# Patient Record
Sex: MALE | Race: WHITE | NOT HISPANIC OR LATINO | URBAN - METROPOLITAN AREA
[De-identification: names, ages, dates, MRNs, and addresses within clinical notes are randomized per-mention and may not be internally consistent; named-entity substitution may affect disease eponyms.]

---

## 2017-10-05 ENCOUNTER — OUTPATIENT (OUTPATIENT)
Dept: OUTPATIENT SERVICES | Facility: HOSPITAL | Age: 77
LOS: 1 days | End: 2017-10-05
Payer: MEDICARE

## 2017-10-05 ENCOUNTER — APPOINTMENT (OUTPATIENT)
Dept: ULTRASOUND IMAGING | Facility: CLINIC | Age: 77
End: 2017-10-05
Payer: MEDICARE

## 2017-10-05 DIAGNOSIS — Z00.8 ENCOUNTER FOR OTHER GENERAL EXAMINATION: ICD-10-CM

## 2017-10-05 PROCEDURE — 93975 VASCULAR STUDY: CPT

## 2017-10-05 PROCEDURE — 93975 VASCULAR STUDY: CPT | Mod: 26

## 2017-10-30 ENCOUNTER — INPATIENT (INPATIENT)
Facility: HOSPITAL | Age: 77
LOS: 5 days | Discharge: HOME HEALTH | DRG: 280 | End: 2017-11-05
Attending: INTERNAL MEDICINE | Admitting: HOSPITALIST
Payer: MEDICARE

## 2017-10-30 VITALS
RESPIRATION RATE: 20 BRPM | DIASTOLIC BLOOD PRESSURE: 70 MMHG | TEMPERATURE: 98 F | WEIGHT: 184.97 LBS | HEART RATE: 60 BPM | HEIGHT: 70 IN | OXYGEN SATURATION: 98 % | SYSTOLIC BLOOD PRESSURE: 132 MMHG

## 2017-10-30 DIAGNOSIS — Z29.9 ENCOUNTER FOR PROPHYLACTIC MEASURES, UNSPECIFIED: ICD-10-CM

## 2017-10-30 DIAGNOSIS — Z95.0 PRESENCE OF CARDIAC PACEMAKER: Chronic | ICD-10-CM

## 2017-10-30 DIAGNOSIS — Z95.5 PRESENCE OF CORONARY ANGIOPLASTY IMPLANT AND GRAFT: Chronic | ICD-10-CM

## 2017-10-30 DIAGNOSIS — N18.3 CHRONIC KIDNEY DISEASE, STAGE 3 (MODERATE): ICD-10-CM

## 2017-10-30 DIAGNOSIS — Z92.89 PERSONAL HISTORY OF OTHER MEDICAL TREATMENT: Chronic | ICD-10-CM

## 2017-10-30 DIAGNOSIS — I20.0 UNSTABLE ANGINA: ICD-10-CM

## 2017-10-30 DIAGNOSIS — K22.70 BARRETT'S ESOPHAGUS WITHOUT DYSPLASIA: ICD-10-CM

## 2017-10-30 DIAGNOSIS — Z98.890 OTHER SPECIFIED POSTPROCEDURAL STATES: Chronic | ICD-10-CM

## 2017-10-30 DIAGNOSIS — I10 ESSENTIAL (PRIMARY) HYPERTENSION: ICD-10-CM

## 2017-10-30 DIAGNOSIS — E78.00 PURE HYPERCHOLESTEROLEMIA, UNSPECIFIED: ICD-10-CM

## 2017-10-30 DIAGNOSIS — Z90.49 ACQUIRED ABSENCE OF OTHER SPECIFIED PARTS OF DIGESTIVE TRACT: Chronic | ICD-10-CM

## 2017-10-30 LAB
ALBUMIN SERPL ELPH-MCNC: 4 G/DL — SIGNIFICANT CHANGE UP (ref 3.3–5.2)
ALP SERPL-CCNC: 63 U/L — SIGNIFICANT CHANGE UP (ref 40–120)
ALT FLD-CCNC: 19 U/L — SIGNIFICANT CHANGE UP
ANION GAP SERPL CALC-SCNC: 18 MMOL/L — HIGH (ref 5–17)
APTT BLD: 24.1 SEC — LOW (ref 27.5–37.4)
AST SERPL-CCNC: 16 U/L — SIGNIFICANT CHANGE UP
BASOPHILS # BLD AUTO: 0 K/UL — SIGNIFICANT CHANGE UP (ref 0–0.2)
BASOPHILS NFR BLD AUTO: 0 % — SIGNIFICANT CHANGE UP (ref 0–2)
BILIRUB SERPL-MCNC: 1 MG/DL — SIGNIFICANT CHANGE UP (ref 0.4–2)
BUN SERPL-MCNC: 26 MG/DL — HIGH (ref 8–20)
CALCIUM SERPL-MCNC: 8.9 MG/DL — SIGNIFICANT CHANGE UP (ref 8.6–10.2)
CHLORIDE SERPL-SCNC: 105 MMOL/L — SIGNIFICANT CHANGE UP (ref 98–107)
CK MB CFR SERPL CALC: 63.4 NG/ML — HIGH (ref 0–6.7)
CK SERPL-CCNC: 58 U/L — SIGNIFICANT CHANGE UP (ref 30–200)
CK SERPL-CCNC: 616 U/L — HIGH (ref 30–200)
CO2 SERPL-SCNC: 17 MMOL/L — LOW (ref 22–29)
CREAT SERPL-MCNC: 1.39 MG/DL — HIGH (ref 0.5–1.3)
EOSINOPHIL # BLD AUTO: 0.2 K/UL — SIGNIFICANT CHANGE UP (ref 0–0.5)
EOSINOPHIL NFR BLD AUTO: 4 % — SIGNIFICANT CHANGE UP (ref 0–6)
GLUCOSE SERPL-MCNC: 122 MG/DL — HIGH (ref 70–115)
HCT VFR BLD CALC: 43.6 % — SIGNIFICANT CHANGE UP (ref 42–52)
HGB BLD-MCNC: 15.4 G/DL — SIGNIFICANT CHANGE UP (ref 14–18)
INR BLD: 1.05 RATIO — SIGNIFICANT CHANGE UP (ref 0.88–1.16)
LYMPHOCYTES # BLD AUTO: 1.6 K/UL — SIGNIFICANT CHANGE UP (ref 1–4.8)
LYMPHOCYTES # BLD AUTO: 8 % — LOW (ref 20–55)
MCHC RBC-ENTMCNC: 34 PG — HIGH (ref 27–31)
MCHC RBC-ENTMCNC: 35.3 G/DL — SIGNIFICANT CHANGE UP (ref 32–36)
MCV RBC AUTO: 96.2 FL — HIGH (ref 80–94)
MONOCYTES # BLD AUTO: 1.8 K/UL — HIGH (ref 0–0.8)
MONOCYTES NFR BLD AUTO: 10 % — SIGNIFICANT CHANGE UP (ref 3–10)
NEUTROPHILS # BLD AUTO: 10 K/UL — HIGH (ref 1.8–8)
NEUTROPHILS NFR BLD AUTO: 72 % — SIGNIFICANT CHANGE UP (ref 37–73)
PLAT MORPH BLD: NORMAL — SIGNIFICANT CHANGE UP
PLATELET # BLD AUTO: 137 K/UL — LOW (ref 150–400)
POTASSIUM SERPL-MCNC: 4 MMOL/L — SIGNIFICANT CHANGE UP (ref 3.5–5.3)
POTASSIUM SERPL-SCNC: 4 MMOL/L — SIGNIFICANT CHANGE UP (ref 3.5–5.3)
PROT SERPL-MCNC: 6.6 G/DL — SIGNIFICANT CHANGE UP (ref 6.6–8.7)
PROTHROM AB SERPL-ACNC: 11.6 SEC — SIGNIFICANT CHANGE UP (ref 9.8–12.7)
RBC # BLD: 4.53 M/UL — LOW (ref 4.6–6.2)
RBC # FLD: 12.9 % — SIGNIFICANT CHANGE UP (ref 11–15.6)
RBC BLD AUTO: SIGNIFICANT CHANGE UP
SODIUM SERPL-SCNC: 140 MMOL/L — SIGNIFICANT CHANGE UP (ref 135–145)
TROPONIN T SERPL-MCNC: 0.47 NG/ML — HIGH (ref 0–0.06)
TROPONIN T SERPL-MCNC: <0.01 NG/ML — SIGNIFICANT CHANGE UP (ref 0–0.06)
VARIANT LYMPHS # BLD: 6 % — SIGNIFICANT CHANGE UP (ref 0–6)
WBC # BLD: 13.7 K/UL — HIGH (ref 4.8–10.8)
WBC # FLD AUTO: 13.7 K/UL — HIGH (ref 4.8–10.8)

## 2017-10-30 PROCEDURE — 71010: CPT | Mod: 26

## 2017-10-30 PROCEDURE — 99285 EMERGENCY DEPT VISIT HI MDM: CPT

## 2017-10-30 PROCEDURE — 99223 1ST HOSP IP/OBS HIGH 75: CPT

## 2017-10-30 PROCEDURE — 71275 CT ANGIOGRAPHY CHEST: CPT | Mod: 26

## 2017-10-30 RX ORDER — ENOXAPARIN SODIUM 100 MG/ML
30 INJECTION SUBCUTANEOUS EVERY 24 HOURS
Qty: 0 | Refills: 0 | Status: DISCONTINUED | OUTPATIENT
Start: 2017-10-30 | End: 2017-10-31

## 2017-10-30 RX ORDER — NITROGLYCERIN 6.5 MG
1 CAPSULE, EXTENDED RELEASE ORAL ONCE
Qty: 0 | Refills: 0 | Status: COMPLETED | OUTPATIENT
Start: 2017-10-30 | End: 2017-10-30

## 2017-10-30 RX ORDER — ASPIRIN/CALCIUM CARB/MAGNESIUM 324 MG
1 TABLET ORAL
Qty: 0 | Refills: 0 | COMMUNITY

## 2017-10-30 RX ORDER — MORPHINE SULFATE 50 MG/1
4 CAPSULE, EXTENDED RELEASE ORAL ONCE
Qty: 0 | Refills: 0 | Status: DISCONTINUED | OUTPATIENT
Start: 2017-10-30 | End: 2017-10-30

## 2017-10-30 RX ORDER — CLOPIDOGREL BISULFATE 75 MG/1
1 TABLET, FILM COATED ORAL
Qty: 0 | Refills: 0 | COMMUNITY

## 2017-10-30 RX ORDER — ENOXAPARIN SODIUM 100 MG/ML
80 INJECTION SUBCUTANEOUS ONCE
Qty: 0 | Refills: 0 | Status: COMPLETED | OUTPATIENT
Start: 2017-10-30 | End: 2017-10-30

## 2017-10-30 RX ORDER — PANTOPRAZOLE SODIUM 20 MG/1
40 TABLET, DELAYED RELEASE ORAL
Qty: 0 | Refills: 0 | Status: DISCONTINUED | OUTPATIENT
Start: 2017-10-30 | End: 2017-11-05

## 2017-10-30 RX ORDER — LOVASTATIN 20 MG
1 TABLET ORAL
Qty: 0 | Refills: 0 | COMMUNITY

## 2017-10-30 RX ORDER — IBUPROFEN 200 MG
600 TABLET ORAL ONCE
Qty: 0 | Refills: 0 | Status: COMPLETED | OUTPATIENT
Start: 2017-10-30 | End: 2017-10-30

## 2017-10-30 RX ORDER — CHOLECALCIFEROL (VITAMIN D3) 125 MCG
0 CAPSULE ORAL
Qty: 0 | Refills: 0 | COMMUNITY

## 2017-10-30 RX ORDER — PANTOPRAZOLE SODIUM 20 MG/1
0 TABLET, DELAYED RELEASE ORAL
Qty: 0 | Refills: 0 | COMMUNITY

## 2017-10-30 RX ORDER — ACETAMINOPHEN 500 MG
1000 TABLET ORAL ONCE
Qty: 0 | Refills: 0 | Status: COMPLETED | OUTPATIENT
Start: 2017-10-30 | End: 2017-10-30

## 2017-10-30 RX ORDER — ASPIRIN/CALCIUM CARB/MAGNESIUM 324 MG
81 TABLET ORAL DAILY
Qty: 0 | Refills: 0 | Status: DISCONTINUED | OUTPATIENT
Start: 2017-10-30 | End: 2017-11-05

## 2017-10-30 RX ORDER — LOSARTAN POTASSIUM 100 MG/1
50 TABLET, FILM COATED ORAL DAILY
Qty: 0 | Refills: 0 | Status: DISCONTINUED | OUTPATIENT
Start: 2017-10-30 | End: 2017-10-31

## 2017-10-30 RX ORDER — OMEGA-3 ACID ETHYL ESTERS 1 G
0 CAPSULE ORAL
Qty: 0 | Refills: 0 | COMMUNITY

## 2017-10-30 RX ORDER — ATORVASTATIN CALCIUM 80 MG/1
40 TABLET, FILM COATED ORAL AT BEDTIME
Qty: 0 | Refills: 0 | Status: DISCONTINUED | OUTPATIENT
Start: 2017-10-30 | End: 2017-11-05

## 2017-10-30 RX ORDER — MORPHINE SULFATE 50 MG/1
2 CAPSULE, EXTENDED RELEASE ORAL ONCE
Qty: 0 | Refills: 0 | Status: DISCONTINUED | OUTPATIENT
Start: 2017-10-30 | End: 2017-10-30

## 2017-10-30 RX ORDER — AMLODIPINE BESYLATE 2.5 MG/1
10 TABLET ORAL DAILY
Qty: 0 | Refills: 0 | Status: DISCONTINUED | OUTPATIENT
Start: 2017-10-30 | End: 2017-10-31

## 2017-10-30 RX ORDER — CLOPIDOGREL BISULFATE 75 MG/1
75 TABLET, FILM COATED ORAL DAILY
Qty: 0 | Refills: 0 | Status: DISCONTINUED | OUTPATIENT
Start: 2017-10-30 | End: 2017-11-05

## 2017-10-30 RX ORDER — MORPHINE SULFATE 50 MG/1
2 CAPSULE, EXTENDED RELEASE ORAL EVERY 4 HOURS
Qty: 0 | Refills: 0 | Status: DISCONTINUED | OUTPATIENT
Start: 2017-10-30 | End: 2017-10-31

## 2017-10-30 RX ADMIN — ATORVASTATIN CALCIUM 40 MILLIGRAM(S): 80 TABLET, FILM COATED ORAL at 22:50

## 2017-10-30 RX ADMIN — ENOXAPARIN SODIUM 30 MILLIGRAM(S): 100 INJECTION SUBCUTANEOUS at 22:49

## 2017-10-30 RX ADMIN — MORPHINE SULFATE 2 MILLIGRAM(S): 50 CAPSULE, EXTENDED RELEASE ORAL at 20:30

## 2017-10-30 RX ADMIN — Medication 1000 MILLIGRAM(S): at 23:20

## 2017-10-30 RX ADMIN — MORPHINE SULFATE 4 MILLIGRAM(S): 50 CAPSULE, EXTENDED RELEASE ORAL at 16:08

## 2017-10-30 RX ADMIN — MORPHINE SULFATE 2 MILLIGRAM(S): 50 CAPSULE, EXTENDED RELEASE ORAL at 15:21

## 2017-10-30 RX ADMIN — ENOXAPARIN SODIUM 80 MILLIGRAM(S): 100 INJECTION SUBCUTANEOUS at 18:32

## 2017-10-30 RX ADMIN — MORPHINE SULFATE 2 MILLIGRAM(S): 50 CAPSULE, EXTENDED RELEASE ORAL at 15:11

## 2017-10-30 RX ADMIN — Medication 1 INCH(S): at 15:11

## 2017-10-30 RX ADMIN — Medication 400 MILLIGRAM(S): at 22:50

## 2017-10-30 RX ADMIN — AMLODIPINE BESYLATE 10 MILLIGRAM(S): 2.5 TABLET ORAL at 22:50

## 2017-10-30 RX ADMIN — MORPHINE SULFATE 2 MILLIGRAM(S): 50 CAPSULE, EXTENDED RELEASE ORAL at 14:42

## 2017-10-30 RX ADMIN — MORPHINE SULFATE 2 MILLIGRAM(S): 50 CAPSULE, EXTENDED RELEASE ORAL at 21:00

## 2017-10-30 NOTE — H&P ADULT - PSH
H/O cardiac pacemaker  2016  H/O heart artery stent  x3 [id number]  H/O transfusion  x2 5/2016  History of appendectomy  1997

## 2017-10-30 NOTE — H&P ADULT - HISTORY OF PRESENT ILLNESS
76 yo M with h/o CAD, CABG, Phil's esophagus, CKD Stage 3, oral ca s/p right mandible resection, CORNELIO presents with several hours of substernal dull chest pain associated with diaphoresis, 10/10, mildly relieved by nitro and morphine, radiates to back. Denies any sick contacts or recent travles. States that he was in his usual state of healthy when he started experiencing the pain.     In the ED, son and wife at bedside. CTA chest negative for dissection.

## 2017-10-30 NOTE — H&P ADULT - ASSESSMENT
78 yo M with h/o CAD, CABG, Phil's esophagus, CKD Stage 3, oral ca s/p right mandible resection, CORNELIO here with unstable angina.

## 2017-10-30 NOTE — ED ADULT NURSE REASSESSMENT NOTE - NS ED NURSE REASSESS COMMENT FT1
Assumed patient care from KARY Chappell at 1930, charting as noted. Received patient lying in bed, a&ox3, able to make needs known. Left wrist iv site without redness or swelling, patent. Patient noted with nitrobid patch on right chest wall. Patient received sinus kale on cm, no complains of pain and discomfort at this time. Plan of care and wait time explained, patient verbalized understanding. Patient not in respiratory distress. To continue to monitor.

## 2017-10-30 NOTE — H&P ADULT - PMH
Coronary artery disease involving native heart, angina presence unspecified, unspecified vessel or lesion type    High cholesterol    Sick sinus syndrome

## 2017-10-30 NOTE — ED PROVIDER NOTE - PMH
No pertinent past medical history Coronary artery disease involving native heart, angina presence unspecified, unspecified vessel or lesion type    High cholesterol    Sick sinus syndrome

## 2017-10-30 NOTE — ED PROVIDER NOTE - OBJECTIVE STATEMENT
77 year old male with PMH CABG and 3 stents, PPM, htn, hld presents with chest pain,. Sx started 1 hour prior to arrival while at rest with a dull pain in the center of his chest, no radiation, constant, /10, no associated SOB, diaphoresis, lightheadedness, and no alleviating/exacerbating factors, not related to exertion, position, deep inspiration.

## 2017-10-30 NOTE — ED ADULT NURSE REASSESSMENT NOTE - NS ED NURSE REASSESS COMMENT FT1
[facility]ist JESSICA Keenan called back at 2045 and was made aware of patient's trop=0.47 and requests for IV tylenol despite PRN Morphine. No order made at this time.

## 2017-10-30 NOTE — ED ADULT NURSE NOTE - OBJECTIVE STATEMENT
77 year old a&ox3 male comes to ED c/ o of sudden chest pain starting an hour and a half a go. as per pt. he was not doing anything. pt. states it sometimes goes to his back . pt. denies nausea, sob. pt. c/o 8/10 pain. medications given as documented. will continue to monitor.

## 2017-10-30 NOTE — CONSULT NOTE ADULT - SUBJECTIVE AND OBJECTIVE BOX
Chief Complaint: chest pain    HPI: 78 yo male with cad and remote cabg and stents-last 2009-presents with several hours of midsternal dull chest pain-severe- wo significant radiation or diaphoresis. Pain not very similar to his prior cardiac episodes. He's been riding a bike outdoors over the last month wo sx's. Today's pain is at rest. PMH + MI/cabg and stents/hypertension/cri/Moreno's esoph/right mandible surgery for Ca/ppm for heart block/sharon-not on any cpap etc. Allergic to ceftin (cdif) and enalapril-hypotension. Non smoker x 30+ yrs no etoh. Denies edema orthopnea  pnd. Denies pulmonary/hepatic/heme/dm or thyroid hx. ROS otherwise neg. OP meds of losarten 50 qd/asa 81 and plavix/mevacor 40/protonix/. other surgery includes AP and GB.    PAST MEDICAL & SURGICAL HISTORY:  High cholesterol  H/O transfusion: x2 5/2016  H/O heart artery stent: x3 [id number]  History of appendectomy: 1997  H/O cardiac pacemaker: 2016      PREVIOUS DIAGNOSTIC TESTING:      ECHO  FINDINGS: 2015- EF 50+%/ paradoxical septum  inferobasal hypo.    STRESS  FINDINGS:    CATHETERIZATION  FINDINGS:    MEDICATIONS  (STANDING):    MEDICATIONS  (PRN):      FAMILY HISTORY:      SOCIAL HISTORY:     CIGARETTES: 0    ALCOHOL: 0    ROS: Negative other than as mentioned in HPI.    Vital Signs Last 24 Hrs  T(C): 36.7 (30 Oct 2017 15:52), Max: 36.7 (30 Oct 2017 15:52)  T(F): 98 (30 Oct 2017 15:52), Max: 98 (30 Oct 2017 15:52)  HR: 55 (30 Oct 2017 15:52) (55 - 60)  BP: 110/70 LA manually-good pulses thruout bilat.  BP(mean): --  RR: 16 flat (30 Oct 2017 15:52) (20 - 20)  SpO2: 98% (30 Oct 2017 15:52) (98% - 98%)    PHYSICAL EXAM:  General: Appears well developed, well nourished alert and cooperative.  HEENT: Head; normocephalic, atraumatic.  Eyes;   Pupils reactive, cornea wnl.  Neck; Supple, no nodes adenopathy, no NVD or carotid bruit or thyromegaly.  CARDIOVASCULAR; No murmur, rub, gallop or lift. Normal S1 and S2.  LUNGS; No rales, rhonchi or wheeze. Normal breath sounds bilaterally.  ABDOMEN ; Soft, nontender without mass or organomegaly. bowel sounds normoactive.  EXTREMITIES; No clubbing, cyanosis or edema. Distal pulses wnl. ROM normal.  SKIN; warm and dry with normal turgor.  NEURO; Alert/oriented x 3/normal motor exam. No pathologic reflexes.    PSYCH; normal affect.            INTERPRETATION OF TELEMETRY:    ECG: VVI paced    I&O's Detail      LABS:                        15.4   13.7  )-----------( 137      ( 30 Oct 2017 14:46 )             43.6     10-30    140  |  105  |  26.0<H>  ----------------------------<  122<H>  4.0   |  17.0<L>  |  1.39<H>    Ca    8.9      30 Oct 2017 14:46    TPro  6.6  /  Alb  4.0  /  TBili  1.0  /  DBili  x   /  AST  16  /  ALT  19  /  AlkPhos  63  10-30    CARDIAC MARKERS ( 30 Oct 2017 14:46 )  x     / <0.01 ng/mL / 58 U/L / x     / x          PT/INR - ( 30 Oct 2017 14:46 )   PT: 11.6 sec;   INR: 1.05 ratio         PTT - ( 30 Oct 2017 14:46 )  PTT:24.1 sec    I&O's Summary      RADIOLOGY & ADDITIONAL STUDIES: Chief Complaint: chest pain    HPI: 76 yo male with cad and remote cabg and stents-last 2009-presents with several hours of midsternal dull chest pain-severe- wo significant radiation or diaphoresis. Pain not very similar to his prior cardiac episodes. He's been riding a bike outdoors over the last month wo sx's. Today's pain is at rest. PMH + MI/cabg and stents/hypertension/cri/Moreno's esoph/right mandible surgery for Ca/ppm for heart block/sharon-not on any cpap etc. Allergic to ceftin (cdif) and enalapril-hypotension. Non smoker x 30+ yrs no etoh. Denies edema orthopnea  pnd. Denies pulmonary/hepatic/heme/dm or thyroid hx. ROS otherwise neg. OP meds of losarten 50 qd/asa 81 and plavix/mevacor 40/protonix/. other surgery includes AP and GB.    PAST MEDICAL & SURGICAL HISTORY:  High cholesterol  H/O transfusion: x2 5/2016  H/O heart artery stent: x3 [phone]  History of appendectomy: 1997  H/O cardiac pacemaker: 2016      PREVIOUS DIAGNOSTIC TESTING:      ECHO  FINDINGS: 2015- EF 50+%/ paradoxical septum  inferobasal hypo.    STRESS  FINDINGS:    CATHETERIZATION  FINDINGS:    MEDICATIONS  (STANDING):    MEDICATIONS  (PRN):      FAMILY HISTORY:      SOCIAL HISTORY:     CIGARETTES: 0    ALCOHOL: 0    ROS: Negative other than as mentioned in HPI.    Vital Signs Last 24 Hrs  T(C): 36.7 (30 Oct 2017 15:52), Max: 36.7 (30 Oct 2017 15:52)  T(F): 98 (30 Oct 2017 15:52), Max: 98 (30 Oct 2017 15:52)  HR: 55 (30 Oct 2017 15:52) (55 - 60)  BP: 110/70 LA manually-good pulses thruout bilat.  BP(mean): --  RR: 16 flat (30 Oct 2017 15:52) (20 - 20)  SpO2: 98% (30 Oct 2017 15:52) (98% - 98%)    PHYSICAL EXAM:  General: Appears well developed, well nourished alert and cooperative.  HEENT: Head; normocephalic, atraumatic.  Eyes;   Pupils reactive, cornea wnl.  Neck; Supple, no nodes adenopathy, no NVD or carotid bruit or thyromegaly.  CARDIOVASCULAR; No murmur, rub, gallop or lift. Normal S1 and S2.  LUNGS; No rales, rhonchi or wheeze. Normal breath sounds bilaterally.  ABDOMEN ; Soft, nontender without mass or organomegaly. bowel sounds normoactive.  EXTREMITIES; No clubbing, cyanosis or edema. Distal pulses wnl. ROM normal.  SKIN; warm and dry with normal turgor.  NEURO; Alert/oriented x 3/normal motor exam. No pathologic reflexes.    PSYCH; normal affect.            INTERPRETATION OF TELEMETRY:    ECG: VVI paced  cxr cardiomeg. nonspecific markings.  I&O's Detail      LABS:                        15.4   13.7  )-----------( 137      ( 30 Oct 2017 14:46 )             43.6     10-30    140  |  105  |  26.0<H>  ----------------------------<  122<H>  4.0   |  17.0<L>  |  1.39<H>    Ca    8.9      30 Oct 2017 14:46    TPro  6.6  /  Alb  4.0  /  TBili  1.0  /  DBili  x   /  AST  16  /  ALT  19  /  AlkPhos  63  10-30    CARDIAC MARKERS ( 30 Oct 2017 14:46 )  x     / <0.01 ng/mL / 58 U/L / x     / x          PT/INR - ( 30 Oct 2017 14:46 )   PT: 11.6 sec;   INR: 1.05 ratio         PTT - ( 30 Oct 2017 14:46 )  PTT:24.1 sec    I&O's Summary      RADIOLOGY & ADDITIONAL STUDIES:

## 2017-10-30 NOTE — ED ADULT NURSE REASSESSMENT NOTE - NS ED NURSE REASSESS COMMENT FT1
Received critical value from Lab, Trop=0.47. Paged [facility] hospitalist via ed pickett clerk for notification. Awaiting call back.

## 2017-10-30 NOTE — H&P ADULT - RS GEN PE MLT RESP DETAILS PC
clear to auscultation bilaterally/no chest wall tenderness/no intercostal retractions/good air movement/breath sounds equal

## 2017-10-30 NOTE — ED PROVIDER NOTE - PSH
H/O cardiac pacemaker  2016  H/O heart artery stent  x3 [phone]  H/O transfusion  x2 5/2016  History of appendectomy  1997

## 2017-10-30 NOTE — ED ADULT NURSE NOTE - PSH
H/O cardiac pacemaker  2016  H/O heart artery stent  x3 [phone]  H/O transfusion  x2 5/2016  History of appendectomy  1997 H/O cardiac pacemaker  2016  H/O heart artery stent  x3 [phone]  H/O transfusion  x2 5/2016  History of appendectomy  1997  History of open heart surgery  x2

## 2017-10-31 DIAGNOSIS — N17.9 ACUTE KIDNEY FAILURE, UNSPECIFIED: ICD-10-CM

## 2017-10-31 DIAGNOSIS — I21.4 NON-ST ELEVATION (NSTEMI) MYOCARDIAL INFARCTION: ICD-10-CM

## 2017-10-31 LAB
ALBUMIN SERPL ELPH-MCNC: 3.6 G/DL — SIGNIFICANT CHANGE UP (ref 3.3–5.2)
ALP SERPL-CCNC: 64 U/L — SIGNIFICANT CHANGE UP (ref 40–120)
ALT FLD-CCNC: 59 U/L — HIGH
ANION GAP SERPL CALC-SCNC: 15 MMOL/L — SIGNIFICANT CHANGE UP (ref 5–17)
ANION GAP SERPL CALC-SCNC: 15 MMOL/L — SIGNIFICANT CHANGE UP (ref 5–17)
ANION GAP SERPL CALC-SCNC: 16 MMOL/L — SIGNIFICANT CHANGE UP (ref 5–17)
APPEARANCE UR: CLEAR — SIGNIFICANT CHANGE UP
AST SERPL-CCNC: 287 U/L — HIGH
BILIRUB SERPL-MCNC: 1.3 MG/DL — SIGNIFICANT CHANGE UP (ref 0.4–2)
BILIRUB UR-MCNC: NEGATIVE — SIGNIFICANT CHANGE UP
BUN SERPL-MCNC: 24 MG/DL — HIGH (ref 8–20)
BUN SERPL-MCNC: 24 MG/DL — HIGH (ref 8–20)
BUN SERPL-MCNC: 31 MG/DL — HIGH (ref 8–20)
CALCIUM SERPL-MCNC: 8.2 MG/DL — LOW (ref 8.6–10.2)
CALCIUM SERPL-MCNC: 8.2 MG/DL — LOW (ref 8.6–10.2)
CALCIUM SERPL-MCNC: 8.5 MG/DL — LOW (ref 8.6–10.2)
CHLORIDE SERPL-SCNC: 102 MMOL/L — SIGNIFICANT CHANGE UP (ref 98–107)
CHLORIDE SERPL-SCNC: 106 MMOL/L — SIGNIFICANT CHANGE UP (ref 98–107)
CHLORIDE SERPL-SCNC: 106 MMOL/L — SIGNIFICANT CHANGE UP (ref 98–107)
CK MB CFR SERPL CALC: 281.7 NG/ML — HIGH (ref 0–6.7)
CK SERPL-CCNC: 3652 U/L — HIGH (ref 30–200)
CO2 SERPL-SCNC: 19 MMOL/L — LOW (ref 22–29)
COLOR SPEC: SIGNIFICANT CHANGE UP
CREAT SERPL-MCNC: 1.45 MG/DL — HIGH (ref 0.5–1.3)
CREAT SERPL-MCNC: 1.45 MG/DL — HIGH (ref 0.5–1.3)
CREAT SERPL-MCNC: 1.68 MG/DL — HIGH (ref 0.5–1.3)
DIFF PNL FLD: NEGATIVE — SIGNIFICANT CHANGE UP
EPI CELLS # UR: SIGNIFICANT CHANGE UP
GLUCOSE SERPL-MCNC: 130 MG/DL — HIGH (ref 70–115)
GLUCOSE SERPL-MCNC: 130 MG/DL — HIGH (ref 70–115)
GLUCOSE SERPL-MCNC: 149 MG/DL — HIGH (ref 70–115)
GLUCOSE UR QL: NEGATIVE MG/DL — SIGNIFICANT CHANGE UP
HCT VFR BLD CALC: 46 % — SIGNIFICANT CHANGE UP (ref 42–52)
HGB BLD-MCNC: 15.7 G/DL — SIGNIFICANT CHANGE UP (ref 14–18)
KETONES UR-MCNC: NEGATIVE — SIGNIFICANT CHANGE UP
LEUKOCYTE ESTERASE UR-ACNC: NEGATIVE — SIGNIFICANT CHANGE UP
MCHC RBC-ENTMCNC: 33.8 PG — HIGH (ref 27–31)
MCHC RBC-ENTMCNC: 34.1 G/DL — SIGNIFICANT CHANGE UP (ref 32–36)
MCV RBC AUTO: 98.9 FL — HIGH (ref 80–94)
NITRITE UR-MCNC: NEGATIVE — SIGNIFICANT CHANGE UP
OSMOLALITY UR: 323 MOSM/KG — SIGNIFICANT CHANGE UP (ref 300–1000)
PH UR: 6 — SIGNIFICANT CHANGE UP (ref 5–8)
PLATELET # BLD AUTO: 144 K/UL — LOW (ref 150–400)
POTASSIUM SERPL-MCNC: 4 MMOL/L — SIGNIFICANT CHANGE UP (ref 3.5–5.3)
POTASSIUM SERPL-MCNC: 4 MMOL/L — SIGNIFICANT CHANGE UP (ref 3.5–5.3)
POTASSIUM SERPL-MCNC: 4.6 MMOL/L — SIGNIFICANT CHANGE UP (ref 3.5–5.3)
POTASSIUM SERPL-SCNC: 4 MMOL/L — SIGNIFICANT CHANGE UP (ref 3.5–5.3)
POTASSIUM SERPL-SCNC: 4 MMOL/L — SIGNIFICANT CHANGE UP (ref 3.5–5.3)
POTASSIUM SERPL-SCNC: 4.6 MMOL/L — SIGNIFICANT CHANGE UP (ref 3.5–5.3)
PROT SERPL-MCNC: 6.2 G/DL — LOW (ref 6.6–8.7)
PROT UR-MCNC: NEGATIVE MG/DL — SIGNIFICANT CHANGE UP
RBC # BLD: 4.65 M/UL — SIGNIFICANT CHANGE UP (ref 4.6–6.2)
RBC # FLD: 13.5 % — SIGNIFICANT CHANGE UP (ref 11–15.6)
RBC CASTS # UR COMP ASSIST: SIGNIFICANT CHANGE UP /HPF (ref 0–4)
SODIUM SERPL-SCNC: 137 MMOL/L — SIGNIFICANT CHANGE UP (ref 135–145)
SODIUM SERPL-SCNC: 140 MMOL/L — SIGNIFICANT CHANGE UP (ref 135–145)
SODIUM SERPL-SCNC: 140 MMOL/L — SIGNIFICANT CHANGE UP (ref 135–145)
SODIUM UR-SCNC: 125 MMOL/L — SIGNIFICANT CHANGE UP
SP GR SPEC: 1 — LOW (ref 1.01–1.02)
TROPONIN T SERPL-MCNC: 3.37 NG/ML — HIGH (ref 0–0.06)
TROPONIN T SERPL-MCNC: 5.6 NG/ML — HIGH (ref 0–0.06)
UROBILINOGEN FLD QL: NEGATIVE MG/DL — SIGNIFICANT CHANGE UP
WBC # BLD: 17.2 K/UL — HIGH (ref 4.8–10.8)
WBC # FLD AUTO: 17.2 K/UL — HIGH (ref 4.8–10.8)
WBC UR QL: SIGNIFICANT CHANGE UP

## 2017-10-31 PROCEDURE — 71275 CT ANGIOGRAPHY CHEST: CPT | Mod: 26

## 2017-10-31 PROCEDURE — 71010: CPT | Mod: 26

## 2017-10-31 PROCEDURE — 99232 SBSQ HOSP IP/OBS MODERATE 35: CPT

## 2017-10-31 PROCEDURE — 93306 TTE W/DOPPLER COMPLETE: CPT | Mod: 26

## 2017-10-31 PROCEDURE — 99223 1ST HOSP IP/OBS HIGH 75: CPT

## 2017-10-31 PROCEDURE — 99233 SBSQ HOSP IP/OBS HIGH 50: CPT

## 2017-10-31 RX ORDER — AMLODIPINE BESYLATE 2.5 MG/1
10 TABLET ORAL DAILY
Qty: 0 | Refills: 0 | Status: DISCONTINUED | OUTPATIENT
Start: 2017-10-31 | End: 2017-10-31

## 2017-10-31 RX ORDER — HEPARIN SODIUM 5000 [USP'U]/ML
INJECTION INTRAVENOUS; SUBCUTANEOUS
Qty: 25000 | Refills: 0 | Status: DISCONTINUED | OUTPATIENT
Start: 2017-10-31 | End: 2017-10-31

## 2017-10-31 RX ORDER — HEPARIN SODIUM 5000 [USP'U]/ML
5000 INJECTION INTRAVENOUS; SUBCUTANEOUS ONCE
Qty: 0 | Refills: 0 | Status: DISCONTINUED | OUTPATIENT
Start: 2017-10-31 | End: 2017-10-31

## 2017-10-31 RX ORDER — METOPROLOL TARTRATE 50 MG
25 TABLET ORAL
Qty: 0 | Refills: 0 | Status: DISCONTINUED | OUTPATIENT
Start: 2017-10-31 | End: 2017-11-01

## 2017-10-31 RX ORDER — FUROSEMIDE 40 MG
80 TABLET ORAL ONCE
Qty: 0 | Refills: 0 | Status: COMPLETED | OUTPATIENT
Start: 2017-10-31 | End: 2017-10-31

## 2017-10-31 RX ORDER — HEPARIN SODIUM 5000 [USP'U]/ML
5000 INJECTION INTRAVENOUS; SUBCUTANEOUS EVERY 6 HOURS
Qty: 0 | Refills: 0 | Status: DISCONTINUED | OUTPATIENT
Start: 2017-10-31 | End: 2017-10-31

## 2017-10-31 RX ORDER — SODIUM CHLORIDE 9 MG/ML
1000 INJECTION INTRAMUSCULAR; INTRAVENOUS; SUBCUTANEOUS ONCE
Qty: 0 | Refills: 0 | Status: COMPLETED | OUTPATIENT
Start: 2017-10-31 | End: 2017-10-31

## 2017-10-31 RX ORDER — MORPHINE SULFATE 50 MG/1
2.5 CAPSULE, EXTENDED RELEASE ORAL EVERY 4 HOURS
Qty: 0 | Refills: 0 | Status: DISCONTINUED | OUTPATIENT
Start: 2017-10-31 | End: 2017-11-05

## 2017-10-31 RX ORDER — SODIUM CHLORIDE 9 MG/ML
1000 INJECTION INTRAMUSCULAR; INTRAVENOUS; SUBCUTANEOUS
Qty: 0 | Refills: 0 | Status: DISCONTINUED | OUTPATIENT
Start: 2017-10-31 | End: 2017-10-31

## 2017-10-31 RX ORDER — ACETYLCYSTEINE 200 MG/ML
1200 VIAL (ML) MISCELLANEOUS EVERY 12 HOURS
Qty: 0 | Refills: 0 | Status: COMPLETED | OUTPATIENT
Start: 2017-10-31 | End: 2017-11-01

## 2017-10-31 RX ORDER — LOSARTAN POTASSIUM 100 MG/1
50 TABLET, FILM COATED ORAL DAILY
Qty: 0 | Refills: 0 | Status: DISCONTINUED | OUTPATIENT
Start: 2017-10-31 | End: 2017-10-31

## 2017-10-31 RX ORDER — MORPHINE SULFATE 50 MG/1
2 CAPSULE, EXTENDED RELEASE ORAL ONCE
Qty: 0 | Refills: 0 | Status: DISCONTINUED | OUTPATIENT
Start: 2017-10-31 | End: 2017-10-31

## 2017-10-31 RX ADMIN — Medication 81 MILLIGRAM(S): at 13:31

## 2017-10-31 RX ADMIN — CLOPIDOGREL BISULFATE 75 MILLIGRAM(S): 75 TABLET, FILM COATED ORAL at 13:30

## 2017-10-31 RX ADMIN — MORPHINE SULFATE 2 MILLIGRAM(S): 50 CAPSULE, EXTENDED RELEASE ORAL at 01:41

## 2017-10-31 RX ADMIN — MORPHINE SULFATE 2.5 MILLIGRAM(S): 50 CAPSULE, EXTENDED RELEASE ORAL at 10:59

## 2017-10-31 RX ADMIN — Medication 1200 MILLIGRAM(S): at 18:57

## 2017-10-31 RX ADMIN — MORPHINE SULFATE 2 MILLIGRAM(S): 50 CAPSULE, EXTENDED RELEASE ORAL at 06:33

## 2017-10-31 RX ADMIN — SODIUM CHLORIDE 100 MILLILITER(S): 9 INJECTION INTRAMUSCULAR; INTRAVENOUS; SUBCUTANEOUS at 05:08

## 2017-10-31 RX ADMIN — Medication 1200 MILLIGRAM(S): at 10:56

## 2017-10-31 RX ADMIN — PANTOPRAZOLE SODIUM 40 MILLIGRAM(S): 20 TABLET, DELAYED RELEASE ORAL at 10:25

## 2017-10-31 RX ADMIN — Medication 80 MILLIGRAM(S): at 17:34

## 2017-10-31 RX ADMIN — ATORVASTATIN CALCIUM 40 MILLIGRAM(S): 80 TABLET, FILM COATED ORAL at 21:44

## 2017-10-31 RX ADMIN — MORPHINE SULFATE 2 MILLIGRAM(S): 50 CAPSULE, EXTENDED RELEASE ORAL at 05:08

## 2017-10-31 RX ADMIN — SODIUM CHLORIDE 100 MILLILITER(S): 9 INJECTION INTRAMUSCULAR; INTRAVENOUS; SUBCUTANEOUS at 01:09

## 2017-10-31 RX ADMIN — MORPHINE SULFATE 2 MILLIGRAM(S): 50 CAPSULE, EXTENDED RELEASE ORAL at 05:38

## 2017-10-31 RX ADMIN — MORPHINE SULFATE 2 MILLIGRAM(S): 50 CAPSULE, EXTENDED RELEASE ORAL at 06:03

## 2017-10-31 RX ADMIN — Medication 25 MILLIGRAM(S): at 18:45

## 2017-10-31 RX ADMIN — Medication 1 INCH(S): at 03:11

## 2017-10-31 RX ADMIN — MORPHINE SULFATE 2 MILLIGRAM(S): 50 CAPSULE, EXTENDED RELEASE ORAL at 01:11

## 2017-10-31 RX ADMIN — SODIUM CHLORIDE 1000 MILLILITER(S): 9 INJECTION INTRAMUSCULAR; INTRAVENOUS; SUBCUTANEOUS at 06:03

## 2017-10-31 RX ADMIN — SODIUM CHLORIDE 100 MILLILITER(S): 9 INJECTION INTRAMUSCULAR; INTRAVENOUS; SUBCUTANEOUS at 09:30

## 2017-10-31 NOTE — PATIENT PROFILE ADULT. - PSH
H/O cardiac pacemaker  2016  H/O heart artery stent  x3 [phone]  H/O transfusion  x2 5/2016  History of appendectomy  1997  History of open heart surgery  x2

## 2017-10-31 NOTE — PROGRESS NOTE ADULT - ASSESSMENT
1. 76 yo male with chest pain and prior cad/mi/cabg and stents-remote-elevated troponins consistent with MI. He has some residual chest pain but no evidence of chf. In view of yesterday's CT w contrast and today's increase in creatinine to 1.45-will defer cath until tomorrow. Will get echo today to eval LV and regional wall motion. Discussed with pt.  2. CRI-dye exposure yesterday-hydrate and repeat creatinine-cath tomorrow.  3. Pacemaker  4. continue asa and plavix and increase lovenox to 80 mg qd. 1. 76 yo male with chest pain and prior cad/mi/cabg and stents-remote-elevated troponins consistent with MI. He has some residual chest pain but no evidence of chf. In view of yesterday's CT w contrast and today's increase in creatinine to 1.45-will defer cath until tomorrow. Will get echo today to eval LV and regional wall motion. Discussed with pt.  2. CRI-dye exposure yesterday-hydrate and repeat creatinine-cath tomorrow. Pt receiving 100 cc of NS/hr.  3. Pacemaker  4. continue asa and plavix and increase lovenox to 80 mg qd.

## 2017-10-31 NOTE — ED ADULT NURSE REASSESSMENT NOTE - NS ED NURSE REASSESS COMMENT FT1
[facility] Hospital[facility] PA beeped via ED  for patient O2Sat 89% room air. Placed on O2 @ 2 lm via nc. Patient denies sob or chest pain. Awaiting callback.

## 2017-10-31 NOTE — ED ADULT NURSE REASSESSMENT NOTE - NS ED NURSE REASSESS COMMENT FT1
[facility] JESSICA Keenan called back and made aware of decreased O2sat and BP at 0114, placed on O2 @ 2 lpm and started as schedule Ns @ 100 cc/hr, current BP and O2sat at 0115, patient denies pain and discomfort, no sob. JESSICA Keenan ordered to continue to monitor patient.

## 2017-10-31 NOTE — ED ADULT NURSE REASSESSMENT NOTE - NS ED NURSE REASSESS COMMENT FT1
verbal report report called to clay in cardiac cath holding room,agreeable to having cardiac cath today

## 2017-10-31 NOTE — PROGRESS NOTE ADULT - ASSESSMENT
Assessment: 77y Male with diagnosis of NSTEMI  Procedure: Left and right heart catheterization    Problem List:  1. HFrEF  2. Acute on chronic stage 3 CKD  3.     Plan:   1. Admit to:     2. Medications:       DAPT:        Statin:        Beta Blocker:        ACEI/ARB:     3. Diagnostics:        Labs:        Radiology:        Cardiology: Assessment: 77y Male with diagnosis of NSTEMI  Procedure: Left and right heart catheterization    Problem List:  1. HFrEF  2. Acute on chronic stage 3 CKD  3. CAD    Plan:   1. Admit to: MICU or 4 [facility]/ONU    2. Medications:       DAPT: Plavix 75mg daily and aspirin 81mg daily       Statin: Lipitor 40mg daily       Beta Blocker: Lopressor 25mg BID       ACEI/ARB: Contraindicated in the setting of worsening renal function       Diuretic: Lasix 80mg IV once (will consider Lasix drip if needed)    3. Diagnostics:        Labs: UA, urine osmolality, urine sodium       Radiology: N/A       Cardiology: N/A    4. Urbano catheter and strict I&O's    5. Nephrology consult called and appreciated    6. MICU consult called.

## 2017-10-31 NOTE — PROGRESS NOTE ADULT - PROBLEM SELECTOR PLAN 2
Known h/o CKD  Cr elevated s/p contrast from CTA  Start mucomyst  C/w IVF  Renal consult pending Now with ZHEN due to contrast induced nephropathy with ATN  Known h/o CKD  Cr elevated s/p contrast from CTA  Start mucomyst  C/w IVF  Renal consult pending

## 2017-10-31 NOTE — CHART NOTE - NSCHARTNOTEFT_GEN_A_CORE
Asked to see pt who was admitted for chest pain, now oxygenation level between 88-90 on 2 liters/nc.  Pt still c/o dull midsternal chest pain, pressure in quality, no radiation, no alleviating or exacerbating factors.  PMH significant for prior CAD, CABG, three cardiac stents, Pacemaker.  Pt is for cardiac cath this morning.    REVIEW OF SYSTEMS:    CONSTITUTIONAL: No fever, weight loss, or fatigue  EYES: No eye pain, visual disturbances, or discharge  EENT:  No difficulty hearing, tinnitus, vertigo; No sinus or throat pain  NECK: No pain or stiffness  BREASTS: No pain, masses, or nipple discharge  RESPIRATORY: No cough, wheezing, chills or hemoptysis; No shortness of breath  CARDIOVASCULAR: + chest pain,  no palpitations, dizziness, or leg swelling  GASTROINTESTINAL: No abdominal or epigastric pain. No nausea, vomiting, or hematemesis; No diarrhea or constipation. No melena or hematochezia.  GENITOURINARY: No dysuria, frequency, hematuria, or incontinence  NEUROLOGICAL: No headaches, memory loss, loss of strength, numbness, or tremors  SKIN: No itching, burning, rashes, or lesions   LYMPH NODES: No enlarged glands  ENDOCRINE: No heat or cold intolerance; No hair loss  MUSCULOSKELETAL: No joint pain or swelling; No muscle, back, or extremity pain  PSYCHIATRIC: No depression, anxiety, mood swings, or difficulty sleeping  HEME/LYMPH: No easy bruising, or bleeding gums  ALLERGY AND IMMUNOLOGIC: No hives or eczema  PAST MEDICAL & SURGICAL HISTORY:  Sick sinus syndrome  Coronary artery disease involving native heart, angina presence unspecified, unspecified vessel or lesion type  High cholesterol  No pertinent past medical history  History of open heart surgery: x2  H/O transfusion: x2 5/2016  H/O heart artery stent: x3 [phone]  History of appendectomy: 1997  H/O cardiac pacemaker: 2016  Patient is a 77y old  Male who presents with a chief complaint of Chest pain (30 Oct 2017 19:16)  Height (cm): 177.8 (10-30 @ 14:01)  Weight (kg): 83.9 (10-30 @ 14:01)  BMI (kg/m2): 26.5 (10-30 @ 14:01)  BSA (m2): 2.02 (10-30 @ 14:01)amLODIPine   Tablet 10 milliGRAM(s) Oral daily  aspirin enteric coated 81 milliGRAM(s) Oral daily  atorvastatin 40 milliGRAM(s) Oral at bedtime  clopidogrel Tablet 75 milliGRAM(s) Oral daily  enoxaparin Injectable 30 milliGRAM(s) SubCutaneous every 24 hours  losartan 50 milliGRAM(s) Oral daily  metoprolol     tartrate 25 milliGRAM(s) Oral two times a day  morphine  - Injectable 2 milliGRAM(s) IV Push once  morphine  - Injectable 2 milliGRAM(s) IV Push every 4 hours PRN  pantoprazole    Tablet 40 milliGRAM(s) Oral before breakfast  sodium chloride 0.9% Bolus 1000 milliLiter(s) IV Bolus once  sodium chloride 0.9%. 1000 milliLiter(s) IV Continuous <Continuous>    On examination: Pt is A&Ox2 in NAD, oxygen sat on 2L/nc 88-90; pulse 55-60, R- 18. BP 99/57  Thorax- nontender on palpation +PPM noted anterior thorax; midline surgical scar  Heart- s1s2 heard, bradycardic no murmur, RRR  Lungs- fine rales left lower base; no wheezing, rhonchi   Abdomen- soft nontender + bowel sounds all quadrants; no organomegaly  Extremities- no edema; pulses equal bilaterally 3+    Impression:  1- hypoxia                      2- CAD with chest pain, unresolved                     3- hypotensive  Plan: 1- NS bolus 1000 cc/hr          2- morphine sulfate 2mg i.v. push stat          3- d/w Dr. Anders, and Dr Anders examined pt as well.  Plan as above.            4- D/W patient plan of care, all questions answered.

## 2017-10-31 NOTE — ED ADULT NURSE REASSESSMENT NOTE - NS ED NURSE REASSESS COMMENT FT1
awake and alert,color good,skin warm and dry, resp even and unlabored, states he feels not great but better than before, no family or visitor with patient, halle, moves self slowly on stretcher for comfort, dr klein at bedside for cardiology, no redness or swelling noted to rt wrist iv site with ns infusing at 100 cc/hr, remains npo, remains on cm in paced rhythm at 55 bpm, denies chest pain at this time, stretcher in lowest position with wheels locked for safety, aware he is being admitted and is agreeable to plan of care, will continue to monitor

## 2017-10-31 NOTE — ED ADULT NURSE REASSESSMENT NOTE - NS ED NURSE REASSESS COMMENT FT1
dr aparicio notified via tele that patient refusing mucomyist  and 89% pulse ox on 5L O2, cxr ordered, patient states he doesn't feel well, denies chest pain or discomfort, resp unlabored, good pleth noted

## 2017-10-31 NOTE — ED ADULT NURSE REASSESSMENT NOTE - NS ED NURSE REASSESS COMMENT FT1
iv fluids d/c'd as ordered, cxr done, no family or visitor with patient, remains npo, c/o intermittent nausea

## 2017-10-31 NOTE — PROGRESS NOTE ADULT - SUBJECTIVE AND OBJECTIVE BOX
SHEELA JOSEPH    [id number]    77y      Male    INTERVAL HPI/OVERNIGHT EVENTS: Troponins elevated. Pt continues to have substernal chest pain.     Hospital course:  76 yo M with h/o CAD, CABG, heart block s/p PPM, Phil's esophagus, CKD Stage 3, oral ca s/p right mandible resection, CORNELIO presents with several hours of substernal dull chest pain associated with diaphoresis, 10/10, mildly relieved by nitro and morphine, radiates to back. Denies any sick contacts or recent travles. States that he was in his usual state of healthy when he started experiencing the pain. In the ED, son and wife at bedside. CTA chest negative for dissection.       REVIEW OF SYSTEMS:    CONSTITUTIONAL: No fever, weight loss, or fatigue  RESPIRATORY: No cough, wheezing     Vital Signs Last 24 Hrs  T(C): 36.7 (31 Oct 2017 07:45), Max: 37 (30 Oct 2017 20:27)  T(F): 98.1 (31 Oct 2017 07:45), Max: 98.6 (30 Oct 2017 20:27)  HR: 55 (31 Oct 2017 07:45) (54 - 60)  BP: 105/70 (31 Oct 2017 07:45) (99/57 - 133/71)  BP(mean): 91 (30 Oct 2017 19:16) (91 - 91)  RR: 20 (31 Oct 2017 07:45) (18 - 25)  SpO2: 92% (31 Oct 2017 07:45) (88% - 98%)     PHYSICAL EXAM:    GENERAL: in discomfort due to chest pain, nontoxic appearing  HEENT: PERRL, +EOMI, MMM  CHEST/LUNG: Clear to percussion bilaterally  HEART: S1S2+, Regular rate and rhythm   ABDOMEN: Soft, Nontender, Nondistended; Bowel sounds present  EXTREMITIES:  no edema       LABS:                        15.4   13.7  )-----------( 137      ( 30 Oct 2017 14:46 )             43.6     10-31    140  |  106  |  24.0<H>  ----------------------------<  130<H>  4.0   |  19.0<L>  |  1.45<H>    Ca    8.2<L>      31 Oct 2017 06:38    TPro  6.2<L>  /  Alb  3.6  /  TBili  1.3  /  DBili  x   /  AST  287<H>  /  ALT  59<H>  /  AlkPhos  64  10-31    PT/INR - ( 30 Oct 2017 14:46 )   PT: 11.6 sec;   INR: 1.05 ratio         PTT - ( 30 Oct 2017 14:46 )  PTT:24.1 sec        MEDICATIONS  (STANDING):  amLODIPine   Tablet 10 milliGRAM(s) Oral daily  aspirin enteric coated 81 milliGRAM(s) Oral daily  atorvastatin 40 milliGRAM(s) Oral at bedtime  clopidogrel Tablet 75 milliGRAM(s) Oral daily  enoxaparin Injectable 30 milliGRAM(s) SubCutaneous every 24 hours  losartan 50 milliGRAM(s) Oral daily  metoprolol     tartrate 25 milliGRAM(s) Oral two times a day  pantoprazole    Tablet 40 milliGRAM(s) Oral before breakfast  sodium chloride 0.9%. 1000 milliLiter(s) (100 mL/Hr) IV Continuous <Continuous>    MEDICATIONS  (PRN):  morphine  - Injectable 2 milliGRAM(s) IV Push every 4 hours PRN Severe Pain (7 - 10)      RADIOLOGY & ADDITIONAL TESTS:

## 2017-10-31 NOTE — CONSULT NOTE ADULT - SUBJECTIVE AND OBJECTIVE BOX
Patient is a 77y old  Male who presents with a chief complaint of Chest pain (30 Oct 2017 19:16)  found with a creat 1.45; Patient to go for cath in AM ; at risk for AZALEA      HPI:  76 yo M with h/o CAD, CABG, and stents now admitted with CP   Phil's esophagus, CKD Stage 3, oral ca s/p right mandible resection, CORNELIO presents with several hours of substernal dull chest pain associated with diaphoresis, 10/10, mildly relieved by nitro and morphine, radiates to back. Presently NS pain when seen  Denies any sick contacts or recent travles. States that he was in his usual state of healthy when he started experiencing the pain.     In the ED, son and wife at bedside. CTA chest negative for dissection. (30 Oct 2017 19:16)      PAST MEDICAL & SURGICAL HISTORY:  Sick sinus syndrome  Coronary artery disease involving native heart, angina presence unspecified, unspecified vessel or lesion type  High cholesterol  History of open heart surgery: x2  H/O transfusion: x2 5/2016  H/O heart artery stent: x3 [id number]  History of appendectomy: 1997  H/O cardiac pacemaker: 2016  No Hx Dm  questionable HX CKD      FAMILY HISTORY:  No pertinent family history in first degree relatives      Social History: smoked 35 yrs ago      REVIEW OF SYSTEMS:  CONSTITUTIONAL: No fever, weight loss, or fatigue  EYES: No eye pain or visual disturbances,   RESPIRATORY: No cough, hemoptysis; sl  SOB  CARDIOVASCULAR:+ chest pain, + diaphoresis - palpitations,    GASTROINTESTINAL: No abdominal , NVD or GIB  GENITOURINARY: No UTI sx  NEUROLOGICAL: No headaches/wk/numbness  MUSCULOSKELETAL: No joint pain or swelling; No muscle, back, or extremity pain      Vital Signs Last 24 Hrs  T(C): 36.6 (31 Oct 2017 14:10), Max: 37 (30 Oct 2017 20:27)  T(F): 97.8 (31 Oct 2017 14:10), Max: 98.6 (30 Oct 2017 20:27)  HR: 56 (31 Oct 2017 16:31) (54 - 61)  BP: 139/75 (31 Oct 2017 16:31) (91/56 - 140/69)  BP(mean): 91 (30 Oct 2017 19:16) (91 - 91)  RR: 30 (31 Oct 2017 16:31) (18 - 33)  SpO2: 90% (31 Oct 2017 16:31) (88% - 98%)    PHYSICAL EXAM:    GENERAL: NAD,   EYES:  conjunctiva and sclera clear  NECK: Supple, No JVD/Bruit, Normal thyroid  NERVOUS SYSTEM:  A/O x3,   CHEST No rales, rhonchi,  or rubs  HEART: Regular rate and rhythm; No murmurs, rubs, or gallops  ABDOMEN: Soft, NT/ND BS+  EXTREMITIES:  + Peripheral Pulses, No C/C/E  SKIN: No rashes or lesions      LABS:                        15.4   13.7  )-----------( 137      ( 30 Oct 2017 14:46 )             43.6     10-31    140  |  106  |  24.0<H>  ----------------------------<  130<H>  4.0   |  19.0<L>  |  1.45<H>    Ca    8.2<L>      31 Oct 2017 06:38    TPro  6.2<L>  /  Alb  3.6  /  TBili  1.3  /  DBili  x   /  AST  287<H>  /  ALT  59<H>  /  AlkPhos  64  10-31    PT/INR - ( 30 Oct 2017 14:46 )   PT: 11.6 sec;   INR: 1.05 ratio         PTT - ( 30 Oct 2017 14:46 )  PTT:24.1 sec        RADIOLOGY & ADDITIONAL TESTS:    MEDICATIONS  (STANDING):  acetylcysteine  Oral Solution  aspirin enteric coated  atorvastatin  clopidogrel Tablet  heparin  Infusion.  heparin  Injectable  heparin  Injectable PRN  metoprolol     tartrate  morphine  - Injectable PRN  pantoprazole    Tablet

## 2017-10-31 NOTE — PROGRESS NOTE ADULT - SUBJECTIVE AND OBJECTIVE BOX
Chief Complaint: recent course and chart reviewed.    HPI: CT of chest neg for dissection etc. Troponins #2&3 are levated unequivocally consistent with MI. Pt still has some residual chest pain but no sob or edema.    PAST MEDICAL & SURGICAL HISTORY:  Sick sinus syndrome  Coronary artery disease involving native heart, angina presence unspecified, unspecified vessel or lesion type  High cholesterol  History of open heart surgery: x2  H/O transfusion: x2 5/2016  H/O heart artery stent: x3 [id number]  History of appendectomy: 1997  H/O cardiac pacemaker: 2016      PREVIOUS DIAGNOSTIC TESTING:      ECHO  FINDINGS: pending    STRESS  FINDINGS:    CATHETERIZATION  FINDINGS: will arrange    MEDICATIONS  (STANDING):  amLODIPine   Tablet 10 milliGRAM(s) Oral daily  aspirin enteric coated 81 milliGRAM(s) Oral daily  atorvastatin 40 milliGRAM(s) Oral at bedtime  clopidogrel Tablet 75 milliGRAM(s) Oral daily  enoxaparin Injectable 30 milliGRAM(s) SubCutaneous every 24 hours  losartan 50 milliGRAM(s) Oral daily  metoprolol     tartrate 25 milliGRAM(s) Oral two times a day  pantoprazole    Tablet 40 milliGRAM(s) Oral before breakfast  sodium chloride 0.9%. 1000 milliLiter(s) (100 mL/Hr) IV Continuous <Continuous>    MEDICATIONS  (PRN):  morphine  - Injectable 2 milliGRAM(s) IV Push every 4 hours PRN Severe Pain (7 - 10)      FAMILY HISTORY:  No pertinent family history in first degree relatives      ROS: Negative other than as mentioned in HPI.    Vital Signs Last 24 Hrs  T(C): 36.7 (31 Oct 2017 06:35), Max: 37 (30 Oct 2017 20:27)  T(F): 98.1 (31 Oct 2017 06:35), Max: 98.6 (30 Oct 2017 20:27)  HR: 58 (31 Oct 2017 06:35) (54 - 60)  BP: 105/70 majually left arm (31 Oct 2017 06:35) (99/57 - 133/71)  BP(mean): 91 (30 Oct 2017 19:16) (91 - 91)  RR: 14 flat (31 Oct 2017 06:35) (18 - 25)  SpO2: 94% (31 Oct 2017 06:35) (88% - 98%)    PHYSICAL EXAM:  General: Appears well developed, well nourished alert and cooperative.  HEENT: Head; normocephalic, atraumatic.  Eyes;   Pupils reactive, cornea wnl.  Neck; Supple, no nodes adenopathy, no NVD or carotid bruit or thyromegaly.  CARDIOVASCULAR; No murmur, rub, gallop or lift. Normal S1 and S2.  LUNGS; No rales, rhonchi or wheeze. Normal breath sounds bilaterally.  ABDOMEN ; Soft, nontender without mass or organomegaly. bowel sounds normoactive.  EXTREMITIES; No clubbing, cyanosis or edema. Distal pulses wnl. ROM normal.  SKIN; warm and dry with normal turgor.  NEURO; Alert/oriented x 3/normal motor exam. No pathologic reflexes.    PSYCH; normal affect.            INTERPRETATION OF TELEMETRY:    ECG: paced  CT of chest w contrast neg.  I&O's Detail      LABS:                        15.4   13.7  )-----------( 137      ( 30 Oct 2017 14:46 )             43.6     10-31    140  |  106  |  24.0<H>  ----------------------------<  130<H>  4.0   |  19.0<L>  |  1.45<H>    Ca    8.2<L>      31 Oct 2017 06:38    TPro  6.2<L>  /  Alb  3.6  /  TBili  1.3  /  DBili  x   /  AST  287<H>  /  ALT  59<H>  /  AlkPhos  64  10-31    CARDIAC MARKERS ( 31 Oct 2017 06:38 )  x     / 3.37 ng/mL / x     / x     / x      CARDIAC MARKERS ( 30 Oct 2017 19:48 )  x     / 0.47 ng/mL / 616 U/L / x     / 63.4 ng/mL  CARDIAC MARKERS ( 30 Oct 2017 14:46 )  x     / <0.01 ng/mL / 58 U/L / x     / x          PT/INR - ( 30 Oct 2017 14:46 )   PT: 11.6 sec;   INR: 1.05 ratio         PTT - ( 30 Oct 2017 14:46 )  PTT:24.1 sec    I&O's Summary      RADIOLOGY & ADDITIONAL STUDIES:

## 2017-10-31 NOTE — PROGRESS NOTE ADULT - SUBJECTIVE AND OBJECTIVE BOX
Department of Cardiology                                                                  [facility]/[place] [address] [address] [facility]                                                            Telephone: [phone]. Fax:[phone]                                                                           Cardiac Catheterization Note       Subjective:  77y  Male who had a left heart catheterization which showed:  CORONARY VESSELS: The coronary circulation is right dominant.  LM:     --  LM: Normal.  LAD:     --  Proximal LAD: There was a 0 % stenosis at the site of a prior stent.  --  Distal LAD: There was a 90 % stenosis in the distal third of the vessel segment.  --  D1: There was a 100 % stenosis. There was good blood supply to the distal myocardium from a graft.  CX:     --  OM1: There was a 100 % stenosis.  RCA:     --  Proximal RCA: There was a 0 % stenosis at the site of a prior stent.  --  Mid RCA: There was a 25 % stenosis.  GRAFTS:     --  Graft to the 1st diagonal: The graft was a LIMA. It was normal.  AORTA: Trace AI. No other patent graft  RHC:   -- Left Ventricle (s/edp): 110/30  -- Pulmonary Artery (S/D/M): 63/24/32  -- Pulmonary Capillary Wedge: 32  -- Right Atrium (a/v/M): 27/29/20  -- Right Ventricle (s/edp): 61/24  -- OUTPUTS: CO by Davin: 3.26  -- OUTPUTS: Davin cardiac index: 1.62        CTA of Chest: 10/31/2017   ·	No evidence of pulmonary emboli.  ·	Small bilateral pleural effusions, a new finding since the prior day.  ·	There is a hiatus hernia. This finding is unchanged.  ·	No evidence of mediastinal or hilar lymphadenopathy. No evidence of a pericardial effusion. No evidence of axillary adenopathy.  ·	Post sternotomy changes..      CTA of Chest: 10/30/2017   ·	The airway is patent with normal caliber and contour.  ·	There are no lung parenchymal consolidations.  ·	There is no pleural effusion or pneumothorax.  ·	[The thoracic and abdominal aorta are normal in caliber. No dissection or aneurysm seen.]   ·	[The origins of the brachiocephalic vessels and mesenteric arteries are patent.] The rest of the mediastinal vessels are normal.  ·	[There is mild cardiomegaly. Status post coronary artery bypass graft procedure. No pericardial effusion.   ·	The liver, spleen, gallbladder, pancreas and adrenal glands are normal.   ·	Both kidneys show symmetric contrast uptake. There is no hydronephrosis.   ·	The unopacified gastrointestinal tract is within normal limits.   ·	There are no retroperitoneal masses or lymphadenopathy  ·	Marginal osteophytes are noted at multiple disc spaces in the spine.     Echo:   ·	Left Ventricle: The left ventricular internal cavity size is moderately increased. Global LV systolic function was moderately decreased. Left ventricular ejection fraction, by visual estimation, is 35 to 40%.  ·	LV Wall Scoring: The basal and mid anterolateral wall, mid and apical inferior wall, and basal and mid inferolateral wall are akinetic.  ·	Right Ventricle: The right ventricular size is normal. RV systolic  function is low normal.  ·	Left Atrium: Normal left atrial size.  ·	Right Atrium: The right atrium is normal in size.  ·	Pericardium: There is no evidence of pericardial effusion.  ·	Mitral Valve: Thickening of the anterior and posterior mitral valve leaflets. Mitral leaflet mobility is normal. Mild to moderate mitral valve regurgitation is seen.  ·	Tricuspid Valve: Mild tricuspid regurgitation is visualized. Estimated pulmonary artery systolic pressure is 48.8 mmHg assuming a right atrial pressure of 8 mmHg, which is consistent with mild pulmonary hypertension.  ·	Aortic Valve: The aortic valve is trileaflet. Sclerotic aortic valve with normal opening. No significant aortic stenosis. Mild aortic valve regurgitation is seen.  ·	Pulmonic Valve: The pulmonic valve was not well visualized. Mild pulmonic valve regurgitation.  ·	Aorta: The aorta at the level of the sinus (3.93 cm) and ascending (3.93 cm) are dilated.  ·	Venous: The inferior vena cava was dilated ,with respiratory size variation greater than 50%.  ·	Additional Comments: A pacer wire is visualized in the right atrium and right ventricle.        PAST MEDICAL & SURGICAL HISTORY:  Sick sinus syndrome  Coronary artery disease involving native heart, angina presence unspecified, unspecified vessel or lesion type  High cholesterol  History of open heart surgery: x2  H/O transfusion: x2 5/2016  H/O heart artery stent: x3 [id number]  History of appendectomy: 1997  H/O cardiac pacemaker (Micra): 2016    FAMILY HISTORY:  No pertinent family history in first degree relatives    MEDICATIONS  (STANDING):  acetylcysteine  Oral Solution 1200 milliGRAM(s) Oral every 12 hours  aspirin enteric coated 81 milliGRAM(s) Oral daily  atorvastatin 40 milliGRAM(s) Oral at bedtime  clopidogrel Tablet 75 milliGRAM(s) Oral daily  heparin  Infusion.  Unit(s)/Hr (10 mL/Hr) IV Continuous <Continuous>  heparin  Injectable 5000 Unit(s) IV Push once  metoprolol     tartrate 25 milliGRAM(s) Oral two times a day  pantoprazole    Tablet 40 milliGRAM(s) Oral before breakfast    MEDICATIONS  (PRN):  heparin  Injectable 5000 Unit(s) IV Push every 6 hours PRN For aPTT less than 40  morphine  - Injectable 2.5 milliGRAM(s) IV Push every 4 hours PRN Severe Pain (7 - 10)    Patient is a 77y old  Male who presents with a chief complaint of Chest pain (30 Oct 2017 19:16)    HEALTH ISSUES - PROBLEM Dx:  NSTEMI (non-ST elevated myocardial infarction)  Moreno esophagus  High cholesterol  Essential hypertension  CKD (chronic kidney disease) stage 3, GFR 30-59 ml/min  Unstable angina        HPI: 78 yo M with h/o CAD, CABG, Phil's esophagus, CKD Stage 3, oral ca s/p right mandible resection, CORNELIO presents with several hours of substernal dull chest pain associated with diaphoresis, 10/10, mildly relieved by nitro and morphine, radiates to back. Denies any sick contacts or recent travles. States that he was in his usual state of healthy when he started experiencing the pain.     In the ED, son and wife at bedside. CTA chest negative for dissection. (30 Oct 2017 19:16)    General: No fatigue, no fevers/chills  Respiratory: No dyspnea, no cough, no wheeze  CV: No chest pain, no palpitations  Abd: No nausea  Neuro: No headache, no dizziness  enalapril (Hives)      Objective:  Vital Signs Last 24 Hrs  T(C): 36.6 (31 Oct 2017 14:10), Max: 37 (30 Oct 2017 20:27)  T(F): 97.8 (31 Oct 2017 14:10), Max: 98.6 (30 Oct 2017 20:27)  HR: 55 (31 Oct 2017 15:45) (54 - 61)  BP: 140/69 (31 Oct 2017 15:45) (91/56 - 140/69)  BP(mean): 91 (30 Oct 2017 19:16) (91 - 91)  RR: 31 (31 Oct 2017 15:45) (18 - 31)  SpO2: 92% (31 Oct 2017 15:45) (88% - 98%)    CM: SR  Neuro: A&OX3, CN 2-12 intact  HEENT: NC, AT  Lungs: CTA B/L  CV: S1, S2, no murmur, RRR  Abd: Soft  Right Groin: Soft, no bleeding, no hematoma  Extremity: + distal pulses  EKG:                         15.4   13.7  )-----------( 137      ( 30 Oct 2017 14:46 )             43.6     10-31    140  |  106  |  24.0  -----------------------<  130  4.0   |  19.0  |  1.45    Ca    8.2      31 Oct 2017 06:38    TPro  6.2  /  Alb  3.6  /  TBili  1.3  /  DBili  x   /  AST  287  /  ALT  59  /  AlkPhos  64  10-31    PT/INR - ( 30 Oct 2017 14:46 )   PT: 11.6 sec;   INR: 1.05 ratio         PTT - ( 30 Oct 2017 14:46 )  PTT:24.1 sec      Contrast Load:  CTA of Chest (10/30/2017): Omnipaque 100 ml.  CTA of Chest (10/31/2017): Visipaque 93 ml   LHC (10/31/2017): Omnipaque 121 ml.      Nephrologist: Dr. Marcio Chavira ([address], [address], [place] [address], [phone]) Department of Cardiology                                                                  [facility]/[place] [address] [address] [facility]                                                            Telephone: [phone]. Fax:[phone]                                                                           Cardiac Catheterization Note       Subjective:  77y  Male who had a left heart catheterization which showed:  CORONARY VESSELS: The coronary circulation is right dominant.  LM:     --  LM: Normal.  LAD:     --  Proximal LAD: There was a 0 % stenosis at the site of a prior stent.  --  Distal LAD: There was a 90 % stenosis in the distal third of the vessel segment.  --  D1: There was a 100 % stenosis. There was good blood supply to the distal myocardium from a graft.  CX:     --  OM1: There was a 100 % stenosis.  RCA:     --  Proximal RCA: There was a 0 % stenosis at the site of a prior stent.  --  Mid RCA: There was a 25 % stenosis.  GRAFTS:     --  Graft to the 1st diagonal: The graft was a LIMA. It was normal.  AORTA: Trace AI. No other patent graft  RHC:   -- Left Ventricle (s/edp): 110/30  -- Pulmonary Artery (S/D/M): 63/24/32  -- Pulmonary Capillary Wedge: 32  -- Right Atrium (a/v/M): 27/29/20  -- Right Ventricle (s/edp): 61/24  -- OUTPUTS: CO by Davin: 3.26  -- OUTPUTS: Davin cardiac index: 1.62        CTA of Chest: 10/31/2017   ·	No evidence of pulmonary emboli.  ·	Small bilateral pleural effusions, a new finding since the prior day.  ·	There is a hiatus hernia. This finding is unchanged.  ·	No evidence of mediastinal or hilar lymphadenopathy. No evidence of a pericardial effusion. No evidence of axillary adenopathy.  ·	Post sternotomy changes..      CTA of Chest: 10/30/2017   ·	The airway is patent with normal caliber and contour.  ·	There are no lung parenchymal consolidations.  ·	There is no pleural effusion or pneumothorax.  ·	[The thoracic and abdominal aorta are normal in caliber. No dissection or aneurysm seen.]   ·	[The origins of the brachiocephalic vessels and mesenteric arteries are patent.] The rest of the mediastinal vessels are normal.  ·	[There is mild cardiomegaly. Status post coronary artery bypass graft procedure. No pericardial effusion.   ·	The liver, spleen, gallbladder, pancreas and adrenal glands are normal.   ·	Both kidneys show symmetric contrast uptake. There is no hydronephrosis.   ·	The unopacified gastrointestinal tract is within normal limits.   ·	There are no retroperitoneal masses or lymphadenopathy  ·	Marginal osteophytes are noted at multiple disc spaces in the spine.     Echo:   ·	Left Ventricle: The left ventricular internal cavity size is moderately increased. Global LV systolic function was moderately decreased. Left ventricular ejection fraction, by visual estimation, is 35 to 40%.  ·	LV Wall Scoring: The basal and mid anterolateral wall, mid and apical inferior wall, and basal and mid inferolateral wall are akinetic.  ·	Right Ventricle: The right ventricular size is normal. RV systolic  function is low normal.  ·	Left Atrium: Normal left atrial size.  ·	Right Atrium: The right atrium is normal in size.  ·	Pericardium: There is no evidence of pericardial effusion.  ·	Mitral Valve: Thickening of the anterior and posterior mitral valve leaflets. Mitral leaflet mobility is normal. Mild to moderate mitral valve regurgitation is seen.  ·	Tricuspid Valve: Mild tricuspid regurgitation is visualized. Estimated pulmonary artery systolic pressure is 48.8 mmHg assuming a right atrial pressure of 8 mmHg, which is consistent with mild pulmonary hypertension.  ·	Aortic Valve: The aortic valve is trileaflet. Sclerotic aortic valve with normal opening. No significant aortic stenosis. Mild aortic valve regurgitation is seen.  ·	Pulmonic Valve: The pulmonic valve was not well visualized. Mild pulmonic valve regurgitation.  ·	Aorta: The aorta at the level of the sinus (3.93 cm) and ascending (3.93 cm) are dilated.  ·	Venous: The inferior vena cava was dilated ,with respiratory size variation greater than 50%.  ·	Additional Comments: A pacer wire is visualized in the right atrium and right ventricle.        PAST MEDICAL & SURGICAL HISTORY:  Sick sinus syndrome  Coronary artery disease involving native heart, angina presence unspecified, unspecified vessel or lesion type  High cholesterol  History of open heart surgery: x2  H/O transfusion: x2 5/2016  H/O heart artery stent: x3 [id number]  History of appendectomy: 1997  H/O cardiac pacemaker (Micra): 2016    FAMILY HISTORY:  No pertinent family history in first degree relatives    MEDICATIONS  (STANDING):  acetylcysteine  Oral Solution 1200 milliGRAM(s) Oral every 12 hours  aspirin enteric coated 81 milliGRAM(s) Oral daily  atorvastatin 40 milliGRAM(s) Oral at bedtime  clopidogrel Tablet 75 milliGRAM(s) Oral daily  heparin  Infusion.  Unit(s)/Hr (10 mL/Hr) IV Continuous <Continuous>  heparin  Injectable 5000 Unit(s) IV Push once  metoprolol     tartrate 25 milliGRAM(s) Oral two times a day  pantoprazole    Tablet 40 milliGRAM(s) Oral before breakfast    MEDICATIONS  (PRN):  heparin  Injectable 5000 Unit(s) IV Push every 6 hours PRN For aPTT less than 40  morphine  - Injectable 2.5 milliGRAM(s) IV Push every 4 hours PRN Severe Pain (7 - 10)    MEDICATIONS  (HOME)  Plavix 75mg daily  Aspirin 81mg daily  Norvasc 10mg daily  Losartan 50mg daily  Lovastatin 40mg daily  Protonix 40mg daily    Patient is a 77y old  Male who presents with a chief complaint of Chest pain (30 Oct 2017 19:16)    HEALTH ISSUES - PROBLEM Dx:  NSTEMI (non-ST elevated myocardial infarction)  Moreno esophagus  High cholesterol  Essential hypertension  CKD (chronic kidney disease) stage 3, GFR 30-59 ml/min  Unstable angina        HPI: 76 yo M with h/o CAD, CABG, Phil's esophagus, CKD Stage 3, oral ca s/p right mandible resection, CORNELIO presents with several hours of substernal dull chest pain associated with diaphoresis, 10/10, mildly relieved by nitro and morphine, radiates to back. Denies any sick contacts or recent travels States that he was in his usual state of healthy when he started experiencing the pain.     In the ED, son and wife at bedside. CTA chest negative for dissection. (30 Oct 2017 19:16)    General: No fatigue, no fevers/chills  Respiratory: No dyspnea, no cough, no wheeze  CV: No chest pain, no palpitations  Abd: No nausea  Neuro: No headache, no dizziness  enalapril (Hives)      Objective:  Vital Signs Last 24 Hrs  T(C): 36.6 (31 Oct 2017 14:10), Max: 37 (30 Oct 2017 20:27)  T(F): 97.8 (31 Oct 2017 14:10), Max: 98.6 (30 Oct 2017 20:27)  HR: 55 (31 Oct 2017 15:45) (54 - 61)  BP: 140/69 (31 Oct 2017 15:45) (91/56 - 140/69)  BP(mean): 91 (30 Oct 2017 19:16) (91 - 91)  RR: 31 (31 Oct 2017 15:45) (18 - 31)  SpO2: 92% (31 Oct 2017 15:45) (88% - 98%)    CM: SR  Neuro: A&OX3, CN 2-12 intact  HEENT: NC, AT  Lungs: CTA B/L  CV: S1, S2, no murmur, RRR  Abd: Soft  Right Groin: Soft, no bleeding, no hematoma  Extremity: + distal pulses  EKG:                         15.4   13.7  )-----------( 137      ( 30 Oct 2017 14:46 )             43.6     10-31    140  |  106  |  24.0  -----------------------<  130  4.0   |  19.0  |  1.45    Ca    8.2      31 Oct 2017 06:38    TPro  6.2  /  Alb  3.6  /  TBili  1.3  /  DBili  x   /  AST  287  /  ALT  59  /  AlkPhos  64  10-31    PT/INR - ( 30 Oct 2017 14:46 )   PT: 11.6 sec;   INR: 1.05 ratio         PTT - ( 30 Oct 2017 14:46 )  PTT:24.1 sec      Contrast Load:  CTA of Chest (10/30/2017): Omnipaque 100 ml.  CTA of Chest (10/31/2017): Visipaque 93 ml   C (10/31/2017): Omnipaque 121 ml.      Nephrologist: Dr. Marcio Chavira ([address], [address], [place] [address], [phone])

## 2017-10-31 NOTE — ED ADULT NURSE REASSESSMENT NOTE - NS ED NURSE REASSESS COMMENT FT1
As per JESSICA Keenan and Doctor Anders, it's ok for the patient's 02sat to go from 88-90% with O2 @ 2 lpm via nc,  Morphine 2 mg IV now and NS I L Bolus. To continue to monitor patient.

## 2017-10-31 NOTE — PROGRESS NOTE ADULT - ASSESSMENT
76 yo M with h/o CAD, CABG, Phil's esophagus, CKD Stage 3, oral ca s/p right mandible resection, CORNELIO here with NSTEMI.

## 2017-10-31 NOTE — CONSULT NOTE ADULT - SUBJECTIVE AND OBJECTIVE BOX
Renal :    Patient is a 77y old  Male who presents with a chief complaint of Chest pain (30 Oct 2017 19:16)    HPI:    76 yo M with h/o CAD, CABG, Phil' s  esophagus, CKD Stage 3, oral ca s/p right mandible resection, CORNELIO presents with several hours of substernal dull chest pain associated with diaphoresis, 10/10, mildly relieved by nitro and morphine, radiates to back.   Denies any sick contacts or recent travels.   States that he was in his usual state of health when he started experiencing the pain.     In the ED, son and wife at bedside. CTA chest negative for dissection. (30 Oct 2017 19:16)    PAST MEDICAL & SURGICAL HISTORY:  Sick sinus syndrome  Coronary artery disease involving native heart, angina presence unspecified, unspecified vessel or lesion type  High cholesterol  History of open heart surgery: x 2 ( 1980s)  H/O transfusion: x2 5/2016  H/O heart artery stent: x3 , [id number]  History of appendectomy: 1997  H/O cardiac pacemaker: 2016    FAMILY HISTORY:  No pertinent family history in first degree relatives    Social History: No ETOH,    MEDICATIONS  (STANDING):  acetylcysteine  Oral Solution 1200 milliGRAM(s) Oral every 12 hours  aspirin enteric coated 81 milliGRAM(s) Oral daily  atorvastatin 40 milliGRAM(s) Oral at bedtime  clopidogrel Tablet 75 milliGRAM(s) Oral daily  furosemide   Injectable 80 milliGRAM(s) IV Push once  heparin  Infusion.  Unit(s)/Hr (10 mL/Hr) IV Continuous <Continuous>  heparin  Injectable 5000 Unit(s) IV Push once  metoprolol     tartrate 25 milliGRAM(s) Oral two times a day  pantoprazole    Tablet 40 milliGRAM(s) Oral before breakfast    MEDICATIONS  (PRN):  heparin  Injectable 5000 Unit(s) IV Push every 6 hours PRN For aPTT less than 40  morphine  - Injectable 2.5 milliGRAM(s) IV Push every 4 hours PRN Severe Pain (7 - 10)      Allergies    enalapril (Hives)    REVIEW OF SYSTEMS:    CONSTITUTIONAL: No fever, weight loss, or fatigue  EYES: No eye pain, visual disturbances, or discharge  NECK: No pain or stiffness  RESPIRATORY: + shortness of breath  CARDIOVASCULAR: No chest pain, palpitations, dizziness, or leg swelling  GASTROINTESTINAL: No abdominal or epigastric pain. +  Nausea, vomiting This AM,  No  hematemesis , diarrhea or constipation. No melena or hematochezia.  GENITOURINARY: No dysuria, frequency, hematuria, or incontinence  NEUROLOGICAL: No headaches, memory loss, loss of strength, numbness, or tremors  SKIN: No itching, burning, rashes, or lesions   LYMPH NODES: No enlarged glands  ENDOCRINE: No heat or cold intolerance; No hair loss  MUSCULOSKELETAL: No joint pain or swelling; No muscle, back, or extremity pain  PSYCHIATRIC: No depression, anxiety, mood swings, or difficulty sleeping  HEME/LYMPH: No easy bruising, or bleeding gums    Vital Signs Last 24 Hrs  T(C): 36.6 (31 Oct 2017 14:10), Max: 37 (30 Oct 2017 20:27)  T(F): 97.8 (31 Oct 2017 14:10), Max: 98.6 (30 Oct 2017 20:27)  HR: 56 (31 Oct 2017 16:46) (54 - 61)  BP: 120/68 (31 Oct 2017 16:46) (91/56 - 140/69)  BP(mean): 91 (30 Oct 2017 19:16) (91 - 91)  RR: 27 (31 Oct 2017 16:46) (18 - 33)  SpO2: 90% (31 Oct 2017 16:46) (88% - 98%)    PHYSICAL EXAM:    GENERAL: NAD, well-developed  HEAD:  Atraumatic, Normocephalic  EYES: EOMI, PERRLA, conjunctiva and sclera clear  NECK: Supple, No JVD, Normal thyroid  NERVOUS SYSTEM:  Alert & Oriented X 3,   CHEST/LUNG: Decreased BS Bases, Sternotomy scar,  HEART: Regular rate and rhythm; No murmurs, rubs, or gallops  ABDOMEN: Soft, Nontender, Nondistended; Bowel sounds present  EXTREMITIES:  2+ Peripheral Pulses, No clubbing, cyanosis, or edema  LYMPH: No lymphadenopathy noted  SKIN: No rashes or lesions,    S/P LHC - R Groin,      LABS:                        15.4   13.7  )-----------( 137      ( 30 Oct 2017 14:46 )             43.6     10-31    140  |  106  |  24.0<H>  ----------------------------<  130<H>  4.0   |  19.0<L>  |  1.45<H>    Ca    8.2<L>      31 Oct 2017 06:38    TPro  6.2<L>  /  Alb  3.6  /  TBili  1.3  /  DBili  x   /  AST  287<H>  /  ALT  59<H>  /  AlkPhos  64  10-31    PT/INR - ( 30 Oct 2017 14:46 )   PT: 11.6 sec;   INR: 1.05 ratio         PTT - ( 30 Oct 2017 14:46 )  PTT:24.1 sec        RADIOLOGY & ADDITIONAL TESTS:    CT Angio Chest w/ IV Cont (10.31.17 @ 15:42)     EXAM:  CT ANGIO CHEST (W)AW IC                          PROCEDURE DATE:  10/31/2017      INTERPRETATION:  CT CHEST WITH IV CONTRAST:    HISTORY: Chest pain.    Date and time of exam: 10/31/2017 3:26 PM.    TECHNIQUE:  Sections were obtained from the apices to the diaphragm   following intravenous contrast.  93 mls of Visipaque 320 was administered   intravenously without complication.    COMPARISON EXAMINATION:   10/30/2017 4:55 PM.    FINDINGS:  No evidence of pulmonary emboli.    Small bilateral pleural effusions, a new finding since the prior day.    There is a hiatus hernia. This finding is unchanged.    No evidence of mediastinal or hilar lymphadenopathy. No evidence of a   pericardial effusion. No evidence of axillary adenopathy.    Post sternotomy changes..      IMPRESSION:      No evidence of pulmonary emboli.    Small bilateral pleural effusions, a new finding since the prior day.

## 2017-10-31 NOTE — ED ADULT NURSE REASSESSMENT NOTE - NS ED NURSE REASSESS COMMENT FT1
[facility] medicine hospitalist JESSICA Keenan notified of patient's current vss at 0504 T=98.0, 56, sinus kale on cm, 99/57 mm/Hg, NS infusing at 100 cc/h, 20, 88% O2sat at 2 lpm via. Patient was just given Morphine and requests for more pain medication as still with chest pain, 5/10, no sob. JESSICA Keenan didn't give any order at this time. [facility] medicine hospitalist JESSICA Keenan notified of patient's current vss at 0504 T=98.0, 56, sinus kale on cm, 99/57 mm/Hg, NS infusing at 100 cc/h, 20, 88% O2sat at 2 lpm via. Patient was just given Morphine and requests for more pain medication as still with chest pain, 5/10, no sob. Due Norvasc, Metoprolol and Cozaar held. JESSICA Keenan didn't give any order at this time.

## 2017-10-31 NOTE — PROGRESS NOTE ADULT - SUBJECTIVE AND OBJECTIVE BOX
Pre Cath Note    77y Male with h/o CAD, CABG, Phil's esophagus, CKD Stage 3, oral ca s/p right mandible resection, CORNELIO presents with several hours of substernal dull chest pain associated with diaphoresis, 10/10, mildly relieved by nitro and morphine, radiates to back. Denies any sick contacts or recent travels States that he was in his usual state of healthy when he started experiencing the pain.    In the ED, son and wife at bedside. CTA chest negative for dissection.     Planned Procedure: Urgent LHC for elevated troponins trending up (5.6)    Pertinent Pre-procedure Medications: ASA and Plavix given in ED         ASA: 3  Mallampati: 2  CCS Class: 4    CTA: negative for dissection      EKG: V Paced    Allergies    enalapril (Hives)    Intolerances        PAST MEDICAL & SURGICAL HISTORY:  Sick sinus syndrome  Coronary artery disease involving native heart, angina presence unspecified, unspecified vessel or lesion type  High cholesterol  History of open heart surgery: x2  H/O transfusion: x2 5/2016  H/O heart artery stent: x3 [id number]  History of appendectomy: 1997  H/O cardiac pacemaker: 2016                            15.4   13.7  )-----------( 137      ( 30 Oct 2017 14:46 )             43.6     10-31    140  |  106  |  24.0<H>  ----------------------------<  130<H>  4.0   |  19.0<L>  |  1.45<H>    Ca    8.2<L>      31 Oct 2017 06:38    TPro  6.2<L>  /  Alb  3.6  /  TBili  1.3  /  DBili  x   /  AST  287<H>  /  ALT  59<H>  /  AlkPhos  64  10-31    CARDIAC MARKERS ( 31 Oct 2017 12:00 )  x     / 5.60 ng/mL / 3652 U/L / x     / 281.7 ng/mL  CARDIAC MARKERS ( 31 Oct 2017 06:38 )  x     / 3.37 ng/mL / x     / x     / x      CARDIAC MARKERS ( 30 Oct 2017 19:48 )  x     / 0.47 ng/mL / 616 U/L / x     / 63.4 ng/mL  CARDIAC MARKERS ( 30 Oct 2017 14:46 )  x     / <0.01 ng/mL / 58 U/L / x     / x          PT/INR - ( 30 Oct 2017 14:46 )   PT: 11.6 sec;   INR: 1.05 ratio         PTT - ( 30 Oct 2017 14:46 )  PTT:24.1 sec    Informed consent to be obtained by interventionalist Dr. Gomez

## 2017-11-01 DIAGNOSIS — N17.0 ACUTE KIDNEY FAILURE WITH TUBULAR NECROSIS: ICD-10-CM

## 2017-11-01 DIAGNOSIS — I50.23 ACUTE ON CHRONIC SYSTOLIC (CONGESTIVE) HEART FAILURE: ICD-10-CM

## 2017-11-01 LAB
ANION GAP SERPL CALC-SCNC: 17 MMOL/L — SIGNIFICANT CHANGE UP (ref 5–17)
BUN SERPL-MCNC: 37 MG/DL — HIGH (ref 8–20)
CALCIUM SERPL-MCNC: 9.1 MG/DL — SIGNIFICANT CHANGE UP (ref 8.6–10.2)
CHLORIDE SERPL-SCNC: 100 MMOL/L — SIGNIFICANT CHANGE UP (ref 98–107)
CO2 SERPL-SCNC: 22 MMOL/L — SIGNIFICANT CHANGE UP (ref 22–29)
CREAT SERPL-MCNC: 2.12 MG/DL — HIGH (ref 0.5–1.3)
GLUCOSE SERPL-MCNC: 151 MG/DL — HIGH (ref 70–115)
MAGNESIUM SERPL-MCNC: 1.8 MG/DL — SIGNIFICANT CHANGE UP (ref 1.6–2.6)
PHOSPHATE SERPL-MCNC: 2.5 MG/DL — SIGNIFICANT CHANGE UP (ref 2.4–4.7)
POTASSIUM SERPL-MCNC: 4.8 MMOL/L — SIGNIFICANT CHANGE UP (ref 3.5–5.3)
POTASSIUM SERPL-SCNC: 4.8 MMOL/L — SIGNIFICANT CHANGE UP (ref 3.5–5.3)
SODIUM SERPL-SCNC: 139 MMOL/L — SIGNIFICANT CHANGE UP (ref 135–145)

## 2017-11-01 PROCEDURE — 99233 SBSQ HOSP IP/OBS HIGH 50: CPT

## 2017-11-01 PROCEDURE — 99223 1ST HOSP IP/OBS HIGH 75: CPT

## 2017-11-01 RX ORDER — CARVEDILOL PHOSPHATE 80 MG/1
6.25 CAPSULE, EXTENDED RELEASE ORAL EVERY 12 HOURS
Qty: 0 | Refills: 0 | Status: DISCONTINUED | OUTPATIENT
Start: 2017-11-01 | End: 2017-11-05

## 2017-11-01 RX ORDER — FUROSEMIDE 40 MG
80 TABLET ORAL ONCE
Qty: 0 | Refills: 0 | Status: COMPLETED | OUTPATIENT
Start: 2017-11-01 | End: 2017-11-01

## 2017-11-01 RX ORDER — ONDANSETRON 8 MG/1
4 TABLET, FILM COATED ORAL EVERY 6 HOURS
Qty: 0 | Refills: 0 | Status: DISCONTINUED | OUTPATIENT
Start: 2017-11-01 | End: 2017-11-05

## 2017-11-01 RX ORDER — FUROSEMIDE 40 MG
40 TABLET ORAL ONCE
Qty: 0 | Refills: 0 | Status: COMPLETED | OUTPATIENT
Start: 2017-11-01 | End: 2017-11-01

## 2017-11-01 RX ORDER — HEPARIN SODIUM 5000 [USP'U]/ML
5000 INJECTION INTRAVENOUS; SUBCUTANEOUS EVERY 8 HOURS
Qty: 0 | Refills: 0 | Status: DISCONTINUED | OUTPATIENT
Start: 2017-11-01 | End: 2017-11-05

## 2017-11-01 RX ADMIN — Medication 80 MILLIGRAM(S): at 01:49

## 2017-11-01 RX ADMIN — HEPARIN SODIUM 5000 UNIT(S): 5000 INJECTION INTRAVENOUS; SUBCUTANEOUS at 14:17

## 2017-11-01 RX ADMIN — MORPHINE SULFATE 2.5 MILLIGRAM(S): 50 CAPSULE, EXTENDED RELEASE ORAL at 01:47

## 2017-11-01 RX ADMIN — HEPARIN SODIUM 5000 UNIT(S): 5000 INJECTION INTRAVENOUS; SUBCUTANEOUS at 22:01

## 2017-11-01 RX ADMIN — CLOPIDOGREL BISULFATE 75 MILLIGRAM(S): 75 TABLET, FILM COATED ORAL at 11:36

## 2017-11-01 RX ADMIN — MORPHINE SULFATE 2.5 MILLIGRAM(S): 50 CAPSULE, EXTENDED RELEASE ORAL at 02:00

## 2017-11-01 RX ADMIN — ONDANSETRON 4 MILLIGRAM(S): 8 TABLET, FILM COATED ORAL at 09:43

## 2017-11-01 RX ADMIN — Medication 81 MILLIGRAM(S): at 11:36

## 2017-11-01 RX ADMIN — PANTOPRAZOLE SODIUM 40 MILLIGRAM(S): 20 TABLET, DELAYED RELEASE ORAL at 06:18

## 2017-11-01 RX ADMIN — Medication 1200 MILLIGRAM(S): at 06:19

## 2017-11-01 RX ADMIN — Medication 40 MILLIGRAM(S): at 19:02

## 2017-11-01 RX ADMIN — Medication 1200 MILLIGRAM(S): at 18:32

## 2017-11-01 RX ADMIN — ATORVASTATIN CALCIUM 40 MILLIGRAM(S): 80 TABLET, FILM COATED ORAL at 22:01

## 2017-11-01 NOTE — PROGRESS NOTE ADULT - ASSESSMENT
76 yo M with h/o CAD, CABG, Phil's esophagus, CKD Stage 3, oral ca s/p right mandible resection, CORNELIO here with NSTEMI. Course complicated by contrast induced nephropathy with ATN and acute systolic CHF exacerbation.

## 2017-11-01 NOTE — CONSULT NOTE ADULT - ASSESSMENT
Impression/Plan:    1) Hypoxic  Respiratory failure related to systolic CHF and fluid overload. Would continue to diurese as tolerated and keep BP < 150. His work of breathing is improving now with diuresis, but not normalized. The patient is currently comfortable and feeling better though.  Can consider BIPAP for additional support if needed.     2) Systolic CHF: related to underlying cardiomyopathy and volume overload. Will need to continue with BP control, afterload reduction and diuresis as tolerated. in face of elevated creatine there is concern for ATN, so will need to carefully follow BMP. F/u Echo and Cardio F/U    3)NSTEMI: related to underlying artherosclerotic disease. Will need to continue with medical management, Antiplatelet therapy, Statin, ACE/ARB, Beta blockade.     4) Acute on chronic renal failure: Ideally would hydrate but with elevated PCWP =32, need to diurese gently now. Will need to avoid other nephrotoxic agents and follow BUN/Creat carefully. Would add Acetylcysteine and ask for Reanal evaluation.    At this time The patient does not require ICU admission as there is no added benefit to the patient. however if his condition deteriorates further  we can re-evaluate him at that time.
1) ZHEN on CKD III likely 2/2 AZALEA  Patient got two contrast loads; CT angio + cardiac catheterization  Check UA  Check Urine electrolytes  Check renal/bladder sono with PVR   Avoid nephrotoxic meds  OK to give mucomyst; unlikely any real benefit after the contrast has already been administered  Holding off on fluids to keep euvolemic given pleural effusions BL    2) HTN  Controlled; c/w current RX, avoid ACEI/ARB in setting of ZHEN    3) HLD  On statin    4) Angina  being followed by cardiology
1.ZHEN - Post IV Contrast Exposure    2.CKD -3,    3.CAD - S/P CABG , Stents,      P : Urbano, Monitor U.O & Trend Scr., & Electrolytes,    IV Lasix, Hold ARB,    Discussed geoffrey Michel * Cath Lab, RN , Family @ Bedside,      Cullen CAREY
Patient with known Hx CAD, s/p CABG/Stents now admitted with CP  No DM; has mild CKD  At risk for AZALEA  was to go for cath tomorrow as conveyed to me but with rising troponins had to be taken urgently  patient already had CTA yesterday which r/o PE  it seems patient also refused am dose of Mucomyst and IVFs were not started  Subseq patient had another CT with IVC  situation explained to me by Dr. Lewis; Patient recd 3 loads of IVC in last 24 hours  Shall avoid or minimise Lasix unless needed for significant SOB  can't add fluids in this situation  watch creatinine  Shall follow; Thanks
1. 76 yo male with hx of prior MI/remote cabg and stents (2009) presents with continuous midsternal dull chest pain. EKG of no help-paced-and first troponin is neg. Pt going for contrast ct of chest to ro aortic dissection. He does have an elevated creatinine making the dye exposure of some concern. Hydrate. If ct is neg. for dissection would admit to tele and trend troponins. Consider cath if creatine remains stable after dye load form CT.  2. Renal impairment-trend creatinines  3.hx of hypertension-bp down but he has received several doses of iv MS.

## 2017-11-01 NOTE — PROGRESS NOTE ADULT - ASSESSMENT
ZHEN on CKD   RCN +/- ATN   studies and cath noted   repeat labs in am   Will follow ZHEN on CKD   RCN +/- ATN   studies and cath noted   repeat labs in am   Hold Losartan   Will follow

## 2017-11-01 NOTE — PROGRESS NOTE ADULT - SUBJECTIVE AND OBJECTIVE BOX
[place] [facility], THE HEART CENTER                                   [address] [place] [address]                                                      PHONE: [phone]                                                         FAX: [phone]  [email or web address]  ---------------------------------------------------------------------------------------------------------------------------------    Overnight events/patient complaints:  sob overnight   icu eval'd     PAST MEDICAL & SURGICAL HISTORY:  Sick sinus syndrome  Coronary artery disease involving native heart, angina presence unspecified, unspecified vessel or lesion type  High cholesterol  History of open heart surgery: x2  H/O transfusion: x2 2016  H/O heart artery stent: x3 [id number]  History of appendectomy:   H/O cardiac pacemaker: 2016      enalapril (Hives)    MEDICATIONS  (STANDING):  acetylcysteine  Oral Solution 1200 milliGRAM(s) Oral every 12 hours  aspirin enteric coated 81 milliGRAM(s) Oral daily  atorvastatin 40 milliGRAM(s) Oral at bedtime  clopidogrel Tablet 75 milliGRAM(s) Oral daily  metoprolol     tartrate 25 milliGRAM(s) Oral two times a day  pantoprazole    Tablet 40 milliGRAM(s) Oral before breakfast    MEDICATIONS  (PRN):  morphine  - Injectable 2.5 milliGRAM(s) IV Push every 4 hours PRN Severe Pain (7 - 10)      Vital Signs Last 24 Hrs  T(C): 37.3 (2017 09:04), Max: 37.3 (2017 09:04)  T(F): 99.2 (2017 09:04), Max: 99.2 (2017 09:04)  HR: 75 (2017 08:31) (54 - 75)  BP: 134/75 (2017 08:31) (91/56 - 140/69)  BP(mean): 85 (2017 04:00) (80 - 94)  RR: 27 (2017 08:31) (18 - 33)  SpO2: 100% (2017 08:31) (90% - 100%)  ICU Vital Signs Last 24 Hrs  SHEELA JOSEPH  I&O's Detail    31 Oct 2017 07:01  -  2017 07:00  --------------------------------------------------------  IN:    Oral Fluid: 540 mL  Total IN: 540 mL    OUT:    Indwelling Catheter - Urethral: 1450 mL    Voided: 175 mL  Total OUT: 1625 mL    Total NET: -1085 mL        I&O's Summary    31 Oct 2017 07:01  -  2017 07:00  --------------------------------------------------------  IN: 540 mL / OUT: 1625 mL / NET: -1085 mL      Drug Dosing Weight  SHEELA JOSEPH      PHYSICAL EXAM:  General: Appears well developed, well nourished alert and cooperative.  HEENT: Head; normocephalic, atraumatic.  Eyes: Pupils reactive, cornea wnl.  Neck: Supple, no nodes adenopathy, no NVD or carotid bruit or thyromegaly.  CARDIOVASCULAR: Normal S1 and S2, No murmur, rub, gallop or lift.   LUNGS: No rales, rhonchi or wheeze. Normal breath sounds bilaterally.  ABDOMEN: Soft, nontender without mass or organomegaly. bowel sounds normoactive.  EXTREMITIES: No clubbing, cyanosis or edema. Distal pulses wnl.   SKIN: warm and dry with normal turgor.  NEURO: Alert/oriented x 3/normal motor exam. No pathologic reflexes.    PSYCH: normal affect.        LABS:                        15.7   17.2  )-----------( 144      ( 31 Oct 2017 23:38 )             46.0     11-    139  |  100  |  37.0<H>  ----------------------------<  151<H>  4.8   |  22.0  |  2.12<H>    Ca    9.1      2017 07:55  Phos  2.5       Mg     1.8         TPro  6.2<L>  /  Alb  3.6  /  TBili  1.3  /  DBili  x   /  AST  287<H>  /  ALT  59<H>  /  AlkPhos  64  10-31    SHEELA JOSEPH  CARDIAC MARKERS ( 31 Oct 2017 12:00 )  x     / 5.60 ng/mL / 3652 U/L / x     / 281.7 ng/mL  CARDIAC MARKERS ( 31 Oct 2017 06:38 )  x     / 3.37 ng/mL / x     / x     / x      CARDIAC MARKERS ( 30 Oct 2017 19:48 )  x     / 0.47 ng/mL / 616 U/L / x     / 63.4 ng/mL  CARDIAC MARKERS ( 30 Oct 2017 14:46 )  x     / <0.01 ng/mL / 58 U/L / x     / x          PT/INR - ( 30 Oct 2017 14:46 )   PT: 11.6 sec;   INR: 1.05 ratio         PTT - ( 30 Oct 2017 14:46 )  PTT:24.1 sec  Urinalysis Basic - ( 31 Oct 2017 17:29 )    Color: Pale Yellow / Appearance: Clear / S.005 / pH: x  Gluc: x / Ketone: Negative  / Bili: Negative / Urobili: Negative mg/dL   Blood: x / Protein: Negative mg/dL / Nitrite: Negative   Leuk Esterase: Negative / RBC: 0-2 /HPF / WBC 0-2   Sq Epi: x / Non Sq Epi: Occasional / Bacteria: x        RADIOLOGY & ADDITIONAL STUDIES:    INTERPRETATION OF TELEMETRY (personally reviewed):     ECHO:     2D AND M-MODE MEASUREMENTS (normal ranges within parentheses):  Left                 Normal   Aorta/Left            Normal  Ventricle:                    Atrium:  IVSd (2D):    0.86  (0.7-1.1) Aortic Root  3.93 cm (2.4-3.7)                 cm             (2D):  LVPWd (2D):   0.72  (0.7-1.1) Left Atrium  5.10 cm (1.9-4.0)                 cm             (2D):  LVIDd (2D):   6.53  (3.4-5.7) LA Volume     33.8                 cm             Index         ml/m²  LVIDs (2D):   5.67                 cm  LV FS (2D):   13.2   (>25%)                  %  LV EF (2D):   28 %   (>55%)  Relative Wall 0.22   (<0.42)  Thickness     LV SYSTOLIC FUNCTION BY 2D PLANIMETRY (MOD):  EF-A4C View: 27.3 % EF-A2C View: 47.1 % EF-Biplane: 38.8 %     LV DIASTOLIC FUNCTION:  MV Peak E: 0.65 m/s e', MV Karena: 0.06 m/s  MV Peak A: 0.48 m/s E/e' Ratio: 10.67  E/A Ratio: 1.36     SPECTRAL DOPPLER ANALYSIS (where applicable):  Mitral Insufficiency by PISA:  MR Volume: 33.43 ml MR Flow Rate: 97.80 ml/s MR EROA: 22.74 mm²     Aortic Valve: AoV Max Tevin: 1.14 m/s AoV Peak P.2 mmHg AoV Mean PG:   3.0 mmHg     LVOT Vmax: 0.70 m/s LVOT VTI: 0.137 m LVOT Diameter: 2.17 cm     AoV Area, Vmax: 2.26 cm² AoV Area, VTI: 1.96 cm² AoV Area, Vmn: 2.34 cm²  Ao VTI: 0.259  Aortic Insufficiency:  AI Half-time: 620 msec     Tricuspid Valve and PA/RV Systolic Pressure: TR Max Velocity: 3.19 m/s   RA Pressure: 8 mmHg RVSP/PASP: 48.8 mmHg        PHYSICIAN INTERPRETATION:  Left Ventricle: The left ventricular internal cavity size is moderately   increased.  Global LV systolic function was moderately decreased. Left ventricular   ejection fraction, by visual estimation, is 35 to 40%.        LV Wall Scoring:  The basal and mid anterolateral wall, mid and apical inferior wall, and   basal  and mid inferolateral wall are akinetic.     Right Ventricle: The right ventricular size is normal. RV systolic   function is low normal.  Left Atrium: Normal left atrial size.  Right Atrium: The right atrium is normal in size.  Pericardium: There is no evidence of pericardial effusion.  Mitral Valve: Thickening of the anterior and posterior mitral valve   leaflets. Mitral leaflet mobility is normal. Mild to moderate mitral   valve regurgitation is seen.  Tricuspid Valve: Mild tricuspid regurgitation is visualized. Estimated   pulmonary artery systolic pressure is 48.8 mmHg assuming a right atrial   pressure of 8 mmHg, which is consistent with mild pulmonary hypertension.  Aortic Valve: The aortic valve is trileaflet. Sclerotic aortic valve with   normal opening. No significant aortic stenosis. Mild aortic valve   regurgitation is seen.  Pulmonic Valve: The pulmonic valve was not well visualized. Mild pulmonic   valve regurgitation.  Aorta: The aorta at the level of the sinus (3.93 cm) and ascending (3.93   cm) are dilated.     Venous: The inferior vena cava was dilated, with respiratory size   variation greater than 50%.  Additional Comments: A pacer wire is visualized in the right atrium and   right ventricle.        Summary:   1. Moderately decreased global left ventricular systolic function Left   ventricular ejection fraction, by visual estimation, is 35 to 40%. Mildly   reduced RV systolic function.   2. Basal and mid anterolateral wall, mid and apical inferior wall, and   basal and mid inferolateral wall are abnormal as described above.   3. Moderately increased left ventricular internal cavity size.   4. Mild to moderate mitral valve regurgitation.   5. Mild aortic regurgitation.   6. The aorta at the level of the sinus (3.93 cm) and ascending (3.93 cm)   are dilated.   7. Estimated pulmonary artery systolic pressure is 48.8 mmHg assuming a   right atrial pressure of 8 mmHg, which is consistent with mild pulmonary   hypertension.   8. There is no evidence of pericardial effusion.       CARDIAC CATHETERIZATION:  LIMA to courtney and the stents in LAD/RCA are patent.  Occluded OM (old?).  Elevated pulmonary pressures.  Med tx for CAD (NQWMI)      ASSESSMENT AND PLAN:  In summary, SHEELA JOSEPH is an 77y Male with past medical history significant for prior cad/mi/cabg and stents-remote-elevated troponins consistent with MI.     -s/p cath- no interventions   -pt being actively diuresed  -pt r/o for PE  -pt will need to have renal function monitored given high contrast load   -continue current cardiac meds/dosages     Thank you for allowing [facility] to participate in the care of this patient.  Please feel free to call with any questions or concerns. [place] [facility]ARD[facility], THE HEART CENTER                                   [address] [place] [address]                                                      PHONE: [phone]                                                         FAX: [phone]  [email or web address]  ---------------------------------------------------------------------------------------------------------------------------------    Overnight events/patient complaints:  sob overnight   icu eval'd     PAST MEDICAL & SURGICAL HISTORY:  Sick sinus syndrome  Coronary artery disease involving native heart, angina presence unspecified, unspecified vessel or lesion type  High cholesterol  History of open heart surgery: x2  H/O transfusion: x2 2016  H/O heart artery stent: x3 [id number]  History of appendectomy:   H/O cardiac pacemaker: 2016      enalapril (Hives)    MEDICATIONS  (STANDING):  acetylcysteine  Oral Solution 1200 milliGRAM(s) Oral every 12 hours  aspirin enteric coated 81 milliGRAM(s) Oral daily  atorvastatin 40 milliGRAM(s) Oral at bedtime  clopidogrel Tablet 75 milliGRAM(s) Oral daily  metoprolol     tartrate 25 milliGRAM(s) Oral two times a day  pantoprazole    Tablet 40 milliGRAM(s) Oral before breakfast    MEDICATIONS  (PRN):  morphine  - Injectable 2.5 milliGRAM(s) IV Push every 4 hours PRN Severe Pain (7 - 10)      Vital Signs Last 24 Hrs  T(C): 37.3 (2017 09:04), Max: 37.3 (2017 09:04)  T(F): 99.2 (2017 09:04), Max: 99.2 (2017 09:04)  HR: 75 (2017 08:31) (54 - 75)  BP: 134/75 (2017 08:31) (91/56 - 140/69)  BP(mean): 85 (2017 04:00) (80 - 94)  RR: 27 (2017 08:31) (18 - 33)  SpO2: 100% (2017 08:31) (90% - 100%)  ICU Vital Signs Last 24 Hrs  SHEELA JOSEPH  I&O's Detail    31 Oct 2017 07:01  -  2017 07:00  --------------------------------------------------------  IN:    Oral Fluid: 540 mL  Total IN: 540 mL    OUT:    Indwelling Catheter - Urethral: 1450 mL    Voided: 175 mL  Total OUT: 1625 mL    Total NET: -1085 mL        I&O's Summary    31 Oct 2017 07:01  -  2017 07:00  --------------------------------------------------------  IN: 540 mL / OUT: 1625 mL / NET: -1085 mL      Drug Dosing Weight  SHEELA JOSEPH      PHYSICAL EXAM:  General: Appears well developed, well nourished alert and cooperative.  HEENT: Head; normocephalic, atraumatic.  Eyes: Pupils reactive, cornea wnl.  Neck: Supple, no nodes adenopathy, no NVD or carotid bruit or thyromegaly.  CARDIOVASCULAR: Normal S1 and S2, No murmur, rub, gallop or lift.   LUNGS: No rales, rhonchi or wheeze. Normal breath sounds bilaterally.  ABDOMEN: Soft, nontender without mass or organomegaly. bowel sounds normoactive.  EXTREMITIES: No clubbing, cyanosis or edema. Distal pulses wnl.   SKIN: warm and dry with normal turgor.  NEURO: Alert/oriented x 3/normal motor exam. No pathologic reflexes.    PSYCH: normal affect.        LABS:                        15.7   17.2  )-----------( 144      ( 31 Oct 2017 23:38 )             46.0     11-    139  |  100  |  37.0<H>  ----------------------------<  151<H>  4.8   |  22.0  |  2.12<H>    Ca    9.1      2017 07:55  Phos  2.5       Mg     1.8         TPro  6.2<L>  /  Alb  3.6  /  TBili  1.3  /  DBili  x   /  AST  287<H>  /  ALT  59<H>  /  AlkPhos  64  10-31    SHEELA JOSEPH  CARDIAC MARKERS ( 31 Oct 2017 12:00 )  x     / 5.60 ng/mL / 3652 U/L / x     / 281.7 ng/mL  CARDIAC MARKERS ( 31 Oct 2017 06:38 )  x     / 3.37 ng/mL / x     / x     / x      CARDIAC MARKERS ( 30 Oct 2017 19:48 )  x     / 0.47 ng/mL / 616 U/L / x     / 63.4 ng/mL  CARDIAC MARKERS ( 30 Oct 2017 14:46 )  x     / <0.01 ng/mL / 58 U/L / x     / x          PT/INR - ( 30 Oct 2017 14:46 )   PT: 11.6 sec;   INR: 1.05 ratio         PTT - ( 30 Oct 2017 14:46 )  PTT:24.1 sec  Urinalysis Basic - ( 31 Oct 2017 17:29 )    Color: Pale Yellow / Appearance: Clear / S.005 / pH: x  Gluc: x / Ketone: Negative  / Bili: Negative / Urobili: Negative mg/dL   Blood: x / Protein: Negative mg/dL / Nitrite: Negative   Leuk Esterase: Negative / RBC: 0-2 /HPF / WBC 0-2   Sq Epi: x / Non Sq Epi: Occasional / Bacteria: x        RADIOLOGY & ADDITIONAL STUDIES:    INTERPRETATION OF TELEMETRY (personally reviewed):     ECHO:     2D AND M-MODE MEASUREMENTS (normal ranges within parentheses):  Left                 Normal   Aorta/Left            Normal  Ventricle:                    Atrium:  IVSd (2D):    0.86  (0.7-1.1) Aortic Root  3.93 cm (2.4-3.7)                 cm             (2D):  LVPWd (2D):   0.72  (0.7-1.1) Left Atrium  5.10 cm (1.9-4.0)                 cm             (2D):  LVIDd (2D):   6.53  (3.4-5.7) LA Volume     33.8                 cm             Index         ml/m²  LVIDs (2D):   5.67                 cm  LV FS (2D):   13.2   (>25%)                  %  LV EF (2D):   28 %   (>55%)  Relative Wall 0.22   (<0.42)  Thickness     LV SYSTOLIC FUNCTION BY 2D PLANIMETRY (MOD):  EF-A4C View: 27.3 % EF-A2C View: 47.1 % EF-Biplane: 38.8 %     LV DIASTOLIC FUNCTION:  MV Peak E: 0.65 m/s e', MV Karena: 0.06 m/s  MV Peak A: 0.48 m/s E/e' Ratio: 10.67  E/A Ratio: 1.36     SPECTRAL DOPPLER ANALYSIS (where applicable):  Mitral Insufficiency by PISA:  MR Volume: 33.43 ml MR Flow Rate: 97.80 ml/s MR EROA: 22.74 mm²     Aortic Valve: AoV Max Tevin: 1.14 m/s AoV Peak P.2 mmHg AoV Mean PG:   3.0 mmHg     LVOT Vmax: 0.70 m/s LVOT VTI: 0.137 m LVOT Diameter: 2.17 cm     AoV Area, Vmax: 2.26 cm² AoV Area, VTI: 1.96 cm² AoV Area, Vmn: 2.34 cm²  Ao VTI: 0.259  Aortic Insufficiency:  AI Half-time: 620 msec     Tricuspid Valve and PA/RV Systolic Pressure: TR Max Velocity: 3.19 m/s   RA Pressure: 8 mmHg RVSP/PASP: 48.8 mmHg        PHYSICIAN INTERPRETATION:  Left Ventricle: The left ventricular internal cavity size is moderately   increased.  Global LV systolic function was moderately decreased. Left ventricular   ejection fraction, by visual estimation, is 35 to 40%.        LV Wall Scoring:  The basal and mid anterolateral wall, mid and apical inferior wall, and   basal  and mid inferolateral wall are akinetic.     Right Ventricle: The right ventricular size is normal. RV systolic   function is low normal.  Left Atrium: Normal left atrial size.  Right Atrium: The right atrium is normal in size.  Pericardium: There is no evidence of pericardial effusion.  Mitral Valve: Thickening of the anterior and posterior mitral valve   leaflets. Mitral leaflet mobility is normal. Mild to moderate mitral   valve regurgitation is seen.  Tricuspid Valve: Mild tricuspid regurgitation is visualized. Estimated   pulmonary artery systolic pressure is 48.8 mmHg assuming a right atrial   pressure of 8 mmHg, which is consistent with mild pulmonary hypertension.  Aortic Valve: The aortic valve is trileaflet. Sclerotic aortic valve with   normal opening. No significant aortic stenosis. Mild aortic valve   regurgitation is seen.  Pulmonic Valve: The pulmonic valve was not well visualized. Mild pulmonic   valve regurgitation.  Aorta: The aorta at the level of the sinus (3.93 cm) and ascending (3.93   cm) are dilated.     Venous: The inferior vena cava was dilated, with respiratory size   variation greater than 50%.  Additional Comments: A pacer wire is visualized in the right atrium and   right ventricle.        Summary:   1. Moderately decreased global left ventricular systolic function Left   ventricular ejection fraction, by visual estimation, is 35 to 40%. Mildly   reduced RV systolic function.   2. Basal and mid anterolateral wall, mid and apical inferior wall, and   basal and mid inferolateral wall are abnormal as described above.   3. Moderately increased left ventricular internal cavity size.   4. Mild to moderate mitral valve regurgitation.   5. Mild aortic regurgitation.   6. The aorta at the level of the sinus (3.93 cm) and ascending (3.93 cm)   are dilated.   7. Estimated pulmonary artery systolic pressure is 48.8 mmHg assuming a   right atrial pressure of 8 mmHg, which is consistent with mild pulmonary   hypertension.   8. There is no evidence of pericardial effusion.       CARDIAC CATHETERIZATION:  LIMA to courtney and the stents in LAD/RCA are patent.  Occluded OM (old?).  Elevated pulmonary pressures.  Med tx for CAD (NQWMI)      ASSESSMENT AND PLAN:  In summary, SHEELA JOSEPH is an 77y Male with past medical history significant for prior cad/mi/cabg and stents-remote-elevated troponins consistent with MI.     New onset systolic cardiomyopathy:  -s/p cath- no interventions   -pt to gently diuresed as per renal recs  -will likely require hydralazine/imdur prior to d/c for afterload reduction   -pt ruled out for PE  -pt will need to have renal function monitored given high contrast load - atleast 3-4 days of monitoring for katelyn  -continue current cardiac meds/dosages     Thank you for allowing [facility] to participate in the care of this patient.  Please feel free to call with any questions or concerns.

## 2017-11-01 NOTE — PROGRESS NOTE ADULT - SUBJECTIVE AND OBJECTIVE BOX
SHEELA JOSEPH    [id number]    77y      Male    Subjective: Patient denies chest pain. States that he was finally able to sleep last night. Just had an episode of nausea and vomiting.     Hospital course:  78 yo M with h/o CAD, CABG, heart block s/p PPM, Phil' s esophagus, CKD Stage 3, oral ca s/p right mandible resection, CORNELIO presents with several hours of substernal dull chest pain associated with diaphoresis, 10/10, mildly relieved by nitro and morphine, radiates to back. Denies any sick contacts or recent travels States that he was in his usual state of healthy when he started experiencing the pain. In the ED, son and wife at bedside. CTA chest negative for dissection. Troponin elevated to 5.60 with CK 3652. Pt continued to experience chest pain that was mildly relieved by morphine. TTE showed EF 35-40%, basal and mid anterolateral wall, mid and apical inferior wall, and basal and mid inferolateral wall akinesis. Pt had urgent cardiac catheterization which showed that LIMA to courtney and stents in LAD /RCA were patent and a possibly chronically occluded OM with PCWP 32. Interventional cardiologist made recommendations for repeat CTA chest given concern for PE. Repeat CTA chest was negative for PE but showed new small b/l pleural effusions. MICU evaluated for acute CHF exacerbation in setting of renal failure. Pt developed respiratory distress, received lasix 80mg IVP, and was placed on nocturnal BiPAP. Pt was upgraded to stepdown.     REVIEW OF SYSTEMS:    CONSTITUTIONAL: No fever   GASTROINTESTINAL: No abdominal or epigastric pain.        Vital Signs Last 24 Hrs  T(C): 37.3 (2017 09:04), Max: 37.3 (2017 09:04)  T(F): 99.2 (2017 09:04), Max: 99.2 (2017 09:04)  HR: 75 (2017 08:31) (54 - 75)  BP: 134/75 (2017 08:31) (91/56 - 140/69)  BP(mean): 85 (2017 04:00) (80 - 94)  RR: 27 (2017 08:31) (18 - 33)  SpO2: 100% (2017 08:31) (90% - 100%) 4LNC    PHYSICAL EXAM:    GENERAL: NAD   HEENT: PERRL, +EOMI, MMM  CHEST/LUNG: bibasilar fine rales  HEART: S1S2+, Regular rate and rhythm  ABDOMEN: Soft, Nontender, Nondistended; Bowel sounds present  EXT: trace pedal edema       LABS:                        15.7   17.2  )-----------( 144      ( 31 Oct 2017 23:38 )             46.0     11-    139  |  100  |  37.0<H>  ----------------------------<  151<H>  4.8   |  22.0  |  2.12<H>    Ca    9.1      2017 07:55  Phos  2.5       Mg     1.8         TPro  6.2<L>  /  Alb  3.6  /  TBili  1.3  /  DBili  x   /  AST  287<H>  /  ALT  59<H>  /  AlkPhos  64  10    PT/INR - ( 30 Oct 2017 14:46 )   PT: 11.6 sec;   INR: 1.05 ratio         PTT - ( 30 Oct 2017 14:46 )  PTT:24.1 sec  Urinalysis Basic - ( 31 Oct 2017 17:29 )    Color: Pale Yellow / Appearance: Clear / S.005 / pH: x  Gluc: x / Ketone: Negative  / Bili: Negative / Urobili: Negative mg/dL   Blood: x / Protein: Negative mg/dL / Nitrite: Negative   Leuk Esterase: Negative / RBC: 0-2 /HPF / WBC 0-2   Sq Epi: x / Non Sq Epi: Occasional / Bacteria: x          MEDICATIONS  (STANDING):  acetylcysteine  Oral Solution 1200 milliGRAM(s) Oral every 12 hours  aspirin enteric coated 81 milliGRAM(s) Oral daily  atorvastatin 40 milliGRAM(s) Oral at bedtime  clopidogrel Tablet 75 milliGRAM(s) Oral daily  pantoprazole    Tablet 40 milliGRAM(s) Oral before breakfast    MEDICATIONS  (PRN):  morphine  - Injectable 2.5 milliGRAM(s) IV Push every 4 hours PRN Severe Pain (7 - 10)  ondansetron Injectable 4 milliGRAM(s) IV Push every 6 hours PRN Nausea and/or Vomiting      RADIOLOGY & ADDITIONAL TESTS:

## 2017-11-01 NOTE — PROGRESS NOTE ADULT - SUBJECTIVE AND OBJECTIVE BOX
Patient is a 77y old  Male who presents with a chief complaint of Chest pain (30 Oct 2017 19:16)    PAST MEDICAL & SURGICAL HISTORY:  Sick sinus syndrome  Coronary artery disease involving native heart, angina presence unspecified, unspecified vessel or lesion type  High cholesterol  History of open heart surgery: x2  H/O transfusion: x2 2016  H/O heart artery stent: x3 [id number]  History of appendectomy:   H/O cardiac pacemaker:     SHEELA JOSEPH   77y    Male    BRIEF HOSPITAL COURSE:  Admit oct 30th with CP.  Cath 10/31 for NSTEMI.  SOB on 50% fios in failure    Review of Systems:  SOB  No CP now         All other ROS are negative.    ICU Vital Signs Last 24 Hrs  T(C): 37 (2017 00:46), Max: 37 (2017 00:46)  T(F): 98.6 (2017 00:46), Max: 98.6 (2017 00:46)  HR: 56 (2017 00:00) (54 - 61)  BP: 125/80 (2017 00:00) (91/56 - 140/69)  BP(mean): 90 (2017 00:00) (80 - 94)  ABP: --  ABP(mean): --  RR: 24 (2017 00:00) (18 - 33)  SpO2: 96% (2017 00:00) (88% - 98%)    Physical Examination:    General: mild resp distress     HEENT:  + JVD     PULM: bibasilar rales    CVS: s1 s2 reg    ABD: soft    EXT: + edema     SKIN: warm    Neuro: alert oriented      LABS:                        15.7   17.2  )-----------( 144      ( 31 Oct 2017 23:38 )             46.0     10-31    137  |  102  |  31.0<H>  ----------------------------<  149<H>  4.6   |  19.0<L>  |  1.68<H>    Ca    8.5<L>      31 Oct 2017 23:38    TPro  6.2<L>  /  Alb  3.6  /  TBili  1.3  /  DBili  x   /  AST  287<H>  /  ALT  59<H>  /  AlkPhos  64  10      CARDIAC MARKERS ( 31 Oct 2017 12:00 )  x     / 5.60 ng/mL / 3652 U/L / x     / 281.7 ng/mL  CARDIAC MARKERS ( 31 Oct 2017 06:38 )  x     / 3.37 ng/mL / x     / x     / x      CARDIAC MARKERS ( 30 Oct 2017 19:48 )  x     / 0.47 ng/mL / 616 U/L / x     / 63.4 ng/mL  CARDIAC MARKERS ( 30 Oct 2017 14:46 )  x     / <0.01 ng/mL / 58 U/L / x     / x          CAPILLARY BLOOD GLUCOSE      PT/INR - ( 30 Oct 2017 14:46 )   PT: 11.6 sec;   INR: 1.05 ratio         PTT - ( 30 Oct 2017 14:46 )  PTT:24.1 sec  Urinalysis Basic - ( 31 Oct 2017 17:29 )    Color: Pale Yellow / Appearance: Clear / S.005 / pH: x  Gluc: x / Ketone: Negative  / Bili: Negative / Urobili: Negative mg/dL   Blood: x / Protein: Negative mg/dL / Nitrite: Negative   Leuk Esterase: Negative / RBC: 0-2 /HPF / WBC 0-2   Sq Epi: x / Non Sq Epi: Occasional / Bacteria: x      CULTURES:      Medications:    furosemide   Injectable 80 milliGRAM(s) IV Push once  metoprolol     tartrate 25 milliGRAM(s) Oral two times a day  morphine  - Injectable 2.5 milliGRAM(s) IV Push every 4 hours PRN  aspirin enteric coated 81 milliGRAM(s) Oral daily  clopidogrel Tablet 75 milliGRAM(s) Oral daily  pantoprazole    Tablet 40 milliGRAM(s) Oral before breakfast  atorvastatin 40 milliGRAM(s) Oral at bedtime  acetylcysteine  Oral Solution 1200 milliGRAM(s) Oral every 12 hours      10-31 @ 07:  -   @ 01:44  --------------------------------------------------------  IN: 240 mL / OUT: 700 mL / NET: -460 mL      RADIOLOGY/IMAGING/ECHO    < from: TTE Echo Complete w/Doppler (10.31.17 @ 08:43) >   Summary:   1. Moderately decreased global left ventricular systolic function Left   ventricular ejection fraction, by visual estimation, is 35 to 40%. Mildly   reduced RV systolic function.   2. Basal and mid anterolateral wall, mid and apical inferior wall, and   basal and mid inferolateral wall are abnormal as described above.   3. Moderately increased left ventricular internal cavity size.   4. Mild to moderate mitral valve regurgitation.   5. Mild aortic regurgitation.   6. The aorta at the level of the sinus (3.93 cm) and ascending (3.93 cm)   are dilated.   7. Estimated pulmonary artery systolic pressure is 48.8 mmHg assuming a   right atrial pressure of 8 mmHg, which is consistent with mild pulmonary   hypertension.   8. There is no evidence of pericardial effusion.   9. ** No prior echocardiograms available for comparison. Findings   discussed with Cath NP working with Dr. Gomez, who requested stat read.      Assessment/Plan:    77m SSS HLD CABG CKD NSTEMI  10/31 cath, non occlusive grafts and stents.  OM closed.  ?? chronic.       Acute on chronic HFrEF  hypoxemic and tachypneic.  Radiographic, clinical CHF with elevated LVEDP.    Concern is for contrast nephropathy worsening  renal fx after 3 recent contrast loads.  However he is in failure and needs to de diuresed.  Getting Mucomyst to potentially protect.       Will placed on BIPAP not so much for respiratory compromise, but to assist with diuresis/ rx CHF. Patient is a 77y old  Male who presents with a chief complaint of Chest pain (30 Oct 2017 19:16)    PAST MEDICAL & SURGICAL HISTORY:  Sick sinus syndrome  Coronary artery disease involving native heart, angina presence unspecified, unspecified vessel or lesion type  High cholesterol  History of open heart surgery: x2  H/O transfusion: x2 2016  H/O heart artery stent: x3 [phone]  History of appendectomy:   H/O cardiac pacemaker:     SHEELA JOSEPH   77y    Male    BRIEF HOSPITAL COURSE:  Admit oct 30th with CP.  Cath 10/31 for NSTEMI.  SOB on 50% fios in failure    Review of Systems:  SOB  No CP now         All other ROS are negative.    ICU Vital Signs Last 24 Hrs  T(C): 37 (2017 00:46), Max: 37 (2017 00:46)  T(F): 98.6 (2017 00:46), Max: 98.6 (2017 00:46)  HR: 56 (2017 00:00) (54 - 61)  BP: 125/80 (2017 00:00) (91/56 - 140/69)  BP(mean): 90 (2017 00:00) (80 - 94)  ABP: --  ABP(mean): --  RR: 24 (2017 00:00) (18 - 33)  SpO2: 96% (2017 00:00) (88% - 98%)    Physical Examination:    General: mild resp distress     HEENT:  + JVD     PULM: bibasilar rales    CVS: s1 s2 reg    ABD: soft    EXT: + edema     SKIN: warm    Neuro: alert oriented      LABS:                        15.7   17.2  )-----------( 144      ( 31 Oct 2017 23:38 )             46.0     10-31    137  |  102  |  31.0<H>  ----------------------------<  149<H>  4.6   |  19.0<L>  |  1.68<H>    Ca    8.5<L>      31 Oct 2017 23:38    TPro  6.2<L>  /  Alb  3.6  /  TBili  1.3  /  DBili  x   /  AST  287<H>  /  ALT  59<H>  /  AlkPhos  64  10      CARDIAC MARKERS ( 31 Oct 2017 12:00 )  x     / 5.60 ng/mL / 3652 U/L / x     / 281.7 ng/mL  CARDIAC MARKERS ( 31 Oct 2017 06:38 )  x     / 3.37 ng/mL / x     / x     / x      CARDIAC MARKERS ( 30 Oct 2017 19:48 )  x     / 0.47 ng/mL / 616 U/L / x     / 63.4 ng/mL  CARDIAC MARKERS ( 30 Oct 2017 14:46 )  x     / <0.01 ng/mL / 58 U/L / x     / x          CAPILLARY BLOOD GLUCOSE      PT/INR - ( 30 Oct 2017 14:46 )   PT: 11.6 sec;   INR: 1.05 ratio         PTT - ( 30 Oct 2017 14:46 )  PTT:24.1 sec  Urinalysis Basic - ( 31 Oct 2017 17:29 )    Color: Pale Yellow / Appearance: Clear / S.005 / pH: x  Gluc: x / Ketone: Negative  / Bili: Negative / Urobili: Negative mg/dL   Blood: x / Protein: Negative mg/dL / Nitrite: Negative   Leuk Esterase: Negative / RBC: 0-2 /HPF / WBC 0-2   Sq Epi: x / Non Sq Epi: Occasional / Bacteria: x      CULTURES:      Medications:    furosemide   Injectable 80 milliGRAM(s) IV Push once  metoprolol     tartrate 25 milliGRAM(s) Oral two times a day  morphine  - Injectable 2.5 milliGRAM(s) IV Push every 4 hours PRN  aspirin enteric coated 81 milliGRAM(s) Oral daily  clopidogrel Tablet 75 milliGRAM(s) Oral daily  pantoprazole    Tablet 40 milliGRAM(s) Oral before breakfast  atorvastatin 40 milliGRAM(s) Oral at bedtime  acetylcysteine  Oral Solution 1200 milliGRAM(s) Oral every 12 hours      10-31 @ 07:  -   @ 01:44  --------------------------------------------------------  IN: 240 mL / OUT: 700 mL / NET: -460 mL      RADIOLOGY/IMAGING/ECHO    < from: TTE Echo Complete w/Doppler (10.31.17 @ 08:43) >   Summary:   1. Moderately decreased global left ventricular systolic function Left   ventricular ejection fraction, by visual estimation, is 35 to 40%. Mildly   reduced RV systolic function.   2. Basal and mid anterolateral wall, mid and apical inferior wall, and   basal and mid inferolateral wall are abnormal as described above.   3. Moderately increased left ventricular internal cavity size.   4. Mild to moderate mitral valve regurgitation.   5. Mild aortic regurgitation.   6. The aorta at the level of the sinus (3.93 cm) and ascending (3.93 cm)   are dilated.   7. Estimated pulmonary artery systolic pressure is 48.8 mmHg assuming a   right atrial pressure of 8 mmHg, which is consistent with mild pulmonary   hypertension.   8. There is no evidence of pericardial effusion.   9. ** No prior echocardiograms available for comparison. Findings   discussed with Cath NP working with Dr. Gmoez, who requested stat read.      Assessment/Plan:    77m SSS HLD CABG CKD NSTEMI  10/31 cath, non occlusive grafts and stents.  OM closed.  ?? chronic.       Acute on chronic HFrEF  hypoxemic and tachypneic.  Radiographic, clinical CHF with elevated LVEDP.    Concern is for contrast nephropathy worsening  renal fx after 3 recent contrast loads.  However he is in failure and needs to de diuresed.  Getting Mucomyst to potentially protect.   He has developed ZHEN on CKD    Will placed on BIPAP not so much for respiratory compromise, but to assist with diuresis/ rx CHF.

## 2017-11-01 NOTE — CONSULT NOTE ADULT - SUBJECTIVE AND OBJECTIVE BOX
THIS IS RE-DOCUMENTATION OF A CONSULT NOTE WRITTEN ON 10/31/2017, WHICH WAS LOST IN THE [facility] EMR    Patient is a 77y old  Male who presents with a chief complaint of Chest pain (30 Oct 2017 19:16)      BRIEF HOSPITAL COURSE: 77M with a PMhx of HTN, and CAD, previous CABG 20+ years ago. He now presented with chest pain and dyspnea. He was noted to have elevated troponins and eveidence of NSTEMI. He was brought to the Cath lab with the following findings. High grade blockages of the LAD/RCA, both which had patent stents. There was also a patent LIMA to the Diagonal branch. The OM was noted to have a 100% blockage, which was felt to likely be chronically occluded. The PCWP was noted to be 32, with an EF of 35%. There was no intervention undertaken and medical management was recommended.       Events last 24 hours: In the cath recovery room he was noted to be dyspneic and we were asked to evaluate him. On arrival he is breathing at 24 bpm and using some accesory muscles, however he is not in distress and is speaking in clear sentences. He denies active chest pain and there is no diaphoresis.  Of note he has also had a CTA which was negative for PE, but shows some PVC and small B/L Pleural effusions. His O2 sats 93-95% on 50% VM, but O2 sat would drop to 88% on RA    PAST MEDICAL & SURGICAL HISTORY:  Sick sinus syndrome  Coronary artery disease involving native heart, angina presence unspecified, unspecified vessel or lesion type  High cholesterol  History of open heart surgery: x2  H/O transfusion: x2 2016  H/O heart artery stent: x3 [id number]  History of appendectomy:   H/O cardiac pacemaker: 2016    Allergies    enalapril (Hives)    Intolerances      FAMILY HISTORY:  No pertinent family history in first degree relatives      Review of Systems:  CONSTITUTIONAL: No fever, chills, or fatigue  EYES: No eye pain, visual disturbances, or discharge  ENMT:  No difficulty hearing, tinnitus, vertigo; No sinus or throat pain  NECK: No pain or stiffness  RESPIRATORY: No cough, wheezing, chills or hemoptysis; Occasional shortness of breath  CARDIOVASCULAR: +chest pain, No palpitations, dizziness, or leg swelling  GASTROINTESTINAL: No abdominal or epigastric pain. No nausea, vomiting, or hematemesis; No diarrhea or constipation. No melena or hematochezia.  GENITOURINARY: No dysuria, frequency, hematuria, or incontinence  NEUROLOGICAL: No headaches, memory loss, loss of strength, numbness, or tremors  SKIN: No itching, burning, rashes, or lesions   MUSCULOSKELETAL: No joint pain or swelling; No muscle, back, or extremity pain  PSYCHIATRIC: No depression, anxiety, mood swings, or difficulty sleeping      Medications:    metoprolol     tartrate 25 milliGRAM(s) Oral two times a day  morphine  - Injectable 2.5 milliGRAM(s) IV Push every 4 hours PRN  aspiin enteric coated 81 milliGRAM(s) Oral daily  clopidogrel Tablet 75 milliGRAM(s) Oral daily  pantoprazole    Tablet 40 milliGRAM(s) Oral before breakfast  atorvastatin 40 milliGRAM(s) Oral at bedtime  acetylcysteine  Oral Solution 1200 milliGRAM(s) Oral every 12 hours          ICU Vital Signs Last 24 Hrs  T(C): 36.9 (2017 05:28), Max: 37 (2017 00:46)  T(F): 98.4 (2017 05:28), Max: 98.6 (2017 00:46)  HR: 61 (2017 04:00) (54 - 61)  BP: 113/70 (2017 04:00) (91/56 - 140/69)  BP(mean): 85 (2017 04:00) (80 - 94)  ABP: --  ABP(mean): --  RR: 20 (2017 06:20) (18 - 33)  SpO2: 96% (2017 06:20) (90% - 98%)    Vital Signs Last 24 Hrs  T(C): 36.9 (2017 05:28), Max: 37 (2017 00:46)  T(F): 98.4 (:28), Max: 98.6 (2017 00:46)  HR: 61 (2017 04:00) (54 - 61)  BP: 113/70 (2017 04:00) (91/56 - 140/69)  BP(mean): 85 (2017 04:00) (80 - 94)  RR: 20 (2017 06:20) (18 - 33)  SpO2: 96% (2017 06:20) (90% - 98%)        I&O's Detail    31 Oct 2017 07:01  -  2017 07:00  --------------------------------------------------------  IN:    Oral Fluid: 540 mL  Total IN: 540 mL    OUT:    Indwelling Catheter - Urethral: 1450 mL    Voided: 175 mL  Total OUT: 1625 mL    Total NET: -1085 mL            LABS:                        15.7   17.2  )-----------( 144                   46.0     10    137  |  102  |  31.0<H>  ----------------------------<  149<H>  4.6   |  19.0<L>  |  1.68<H>    Ca    8.5<L>      31 Oct 2017 23:38    TPro  6.2<L>  /  Alb  3.6  /  TBili  1.3  /  DBili  x   /  AST  287<H>  /  ALT  59<H>  /  AlkPhos  64  10-31      CARDIAC MARKERS ( 31 Oct 2017 12:00 )  x     / 5.60 ng/mL / 3652 U/L / x     / 281.7 ng/mL  CARDIAC MARKERS ( 31 Oct 2017 06:38 )  x     / 3.37 ng/mL / x     / x     / x      CARDIAC MARKERS ( 30 Oct 2017 19:48 )  x     / 0.47 ng/mL / 616 U/L / x     / 63.4 ng/mL  CARDIAC MARKERS ( 30 Oct 2017 14:46 )  x     / <0.01 ng/mL / 58 U/L / x     / x          CAPILLARY BLOOD GLUCOSE        PT/INR - ( 30 Oct 2017 14:46 )   PT: 11.6 sec;   INR: 1.05 ratio         PTT - ( 30 Oct 2017 14:46 )  PTT:24.1 sec  Urinalysis Basic - ( 31 Oct 2017 17:29 )    Color: Pale Yellow / Appearance: Clear / S.005 / pH: x  Gluc: x / Ketone: Negative  / Bili: Negative / Urobili: Negative mg/dL   Blood: x / Protein: Negative mg/dL / Nitrite: Negative   Leuk Esterase: Negative / RBC: 0-2 /HPF / WBC 0-2   Sq Epi: x / Non Sq Epi: Occasional / Bacteria: x      CULTURES:      Physical Examination:    General: Tachypneic but No acute distress.  Alert, oriented, interactive, nonfocal    HEENT: Pupils equal, reactive to light.  Symmetric.    PULM: Good BS B/L with Rales Mid to Base B/L no wheezing     CVS: Regular rate and rhythm, no murmurs, rubs, or gallops    ABD: Soft, nondistended, nontender, normoactive bowel sounds, no masses    EXT: No edema, nontender    SKIN: Warm and well perfused, no rashes noted.    RADIOLOGY:   CTA Chest : IMPRESSION:      No evidence of pulmonary emboli.    Small bilateral pleural effusions, a new finding since the prior day..    CRITICAL CARE TIME SPENT: 35 mins

## 2017-11-01 NOTE — PROGRESS NOTE ADULT - SUBJECTIVE AND OBJECTIVE BOX
NEPHROLOGY INTERVAL HPI/OVERNIGHT EVENTS:    interim noted   CT angio --> No PE   Cath results noted   No SOB or CP   + ramos     MEDICATIONS  (STANDING):  acetylcysteine  Oral Solution 1200 milliGRAM(s) Oral every 12 hours  aspirin enteric coated 81 milliGRAM(s) Oral daily  atorvastatin 40 milliGRAM(s) Oral at bedtime  carvedilol 6.25 milliGRAM(s) Oral every 12 hours  clopidogrel Tablet 75 milliGRAM(s) Oral daily  heparin  Injectable 5000 Unit(s) SubCutaneous every 8 hours  pantoprazole    Tablet 40 milliGRAM(s) Oral before breakfast    MEDICATIONS  (PRN):  morphine  - Injectable 2.5 milliGRAM(s) IV Push every 4 hours PRN Severe Pain (7 - 10)  ondansetron Injectable 4 milliGRAM(s) IV Push every 6 hours PRN Nausea and/or Vomiting      Allergies    enalapril (Hives)    Intolerances            Vital Signs Last 24 Hrs  T(C): 37.3 (2017 16:22), Max: 37.7 (2017 12:13)  T(F): 99.2 (2017 16:22), Max: 99.9 (2017 12:13)  HR: 55 (2017 16:38) (54 - 75)  BP: 110/66 (2017 16:38) (104/68 - 137/95)  BP(mean): 85 (2017 04:00) (80 - 94)  RR: 28 (2017 16:38) (18 - 33)  SpO2: 93% (2017 16:38) (90% - 100%)  Daily     Daily Weight in k (2017 05:28)  I&O's Detail    31 Oct 2017 07:  -  2017 07:00  --------------------------------------------------------  IN:    Oral Fluid: 540 mL  Total IN: 540 mL    OUT:    Indwelling Catheter - Urethral: 1450 mL    Voided: 175 mL  Total OUT: 1625 mL    Total NET: -1085 mL      2017 07:01  -  2017 17:39  --------------------------------------------------------  IN:    Oral Fluid: 480 mL  Total IN: 480 mL    OUT:    Indwelling Catheter - Urethral: 250 mL  Total OUT: 250 mL    Total NET: 230 mL        I&O's Summary    31 Oct 2017 07: 07:00  --------------------------------------------------------  IN: 540 mL / OUT: 1625 mL / NET: -1085 mL    :  -  2017 17:39  --------------------------------------------------------  IN: 480 mL / OUT: 250 mL / NET: 230 mL        PHYSICAL EXAM:  EYES:  conjunctiva and sclera clear  NECK: Supple, No JVD/Bruit, Normal thyroid  NERVOUS SYSTEM:  A/O x3,   CHEST No rales, rhonchi,  or rubs  HEART: Regular rate and rhythm; No murmurs, rubs, or gallops  ABDOMEN: Soft, NT/ND BS+  EXTREMITIES:  + Peripheral Pulses, No C/C/E  SKIN: No rashes or lesions    LABS:                        15.7   17.2  )-----------( 144      ( 31 Oct 2017 23:38 )             46.0         139  |  100  |  37.0<H>  ----------------------------<  151<H>  4.8   |  22.0  |  2.12<H>    Ca    9.1      2017 07:55  Phos  2.5       Mg     1.8         TPro  6.2<L>  /  Alb  3.6  /  TBili  1.3  /  DBili  x   /  AST  287<H>  /  ALT  59<H>  /  AlkPhos  64  10-31      Urinalysis Basic - ( 31 Oct 2017 17:29 )    Color: Pale Yellow / Appearance: Clear / S.005 / pH: x  Gluc: x / Ketone: Negative  / Bili: Negative / Urobili: Negative mg/dL   Blood: x / Protein: Negative mg/dL / Nitrite: Negative   Leuk Esterase: Negative / RBC: 0-2 /HPF / WBC 0-2   Sq Epi: x / Non Sq Epi: Occasional / Bacteria: x      Magnesium, Serum: 1.8 mg/dL ( @ 07:54)  Phosphorus Level, Serum: 2.5 mg/dL ( @ 07:54)        EXAM:  US DPLX KIDNEY(IES)        PROCEDURE DATE:  10/05/2017           INTERPRETATION:  CLINICAL INFORMATION: Controlled hypertension. Elevated   creatinine.    COMPARISON: None available.    TECHNIQUE: Color and spectral Doppler evaluation of thekidneys.     FINDINGS:    Right kidney:  9.5 cm. There are 2 cysts measuring up to 1.7 cm. No solid   renal mass, hydronephrosis or calculi.    Left kidney:  10.7 cm. No renal mass, hydronephrosis or calculi.    Urinary bladder: Within normal limits.    Color and spectral Doppler reveals normal, symmetric blood flow   throughout both kidneys.    Peak aortic velocity is 72 cm/sec.      IVC/Renal Veins: Patent.    RIGHT  Renal Artery:   Peak systolic velocity not visualized origin, 42 cm/sec proximal, 34   cm/sec mid, 83 cm/sec distal and 66 cm/sec hilum.   Upper Segmental Artery:  RI = 0.6  Middle Segmental Artery: RI = 0.6  Lower Segmental Artery: RI = 0.6    LEFT  Renal Artery:  Peak systolic velocity is not visualized origin, 99 cm/sec proximal, 113   cm/sec mid, 75 cm/sec distal and 43 cm/sec hilum.   Upper Segmental Artery:  RI = 0.5  Middle Segmental Artery: RI = 0.6  Lower Segmental Artery: RI = 0.6    IMPRESSION:     No evidence of a significant renal artery stenosis.    Right renal cyst.                       RADIOLOGY & ADDITIONAL TESTS:

## 2017-11-01 NOTE — PROGRESS NOTE ADULT - SUBJECTIVE AND OBJECTIVE BOX
S/P cardiac cath  Right groin site benign  No bleeding, no hematoma noted  Post procedure teaching done  Patient verbalizes understanding  < from: Cardiac Cath Lab - Adult (10.31.17 @ 14:09) >  DIAGNOSTIC IMPRESSIONS: LIMA to courtney and stents in LAD /RCA are patent.  Occluded OM (old?) with severely elevated pulmonary pressure.  DIAGNOSTIC RECOMMENDATIONS: CT of chest to r/o PE The patient should  continue with the present medications for CAD  INTERVENTIONAL IMPRESSIONS: LIMA to courtney and stents in LAD /RCA are patent.  Occluded OM (old?) with severely elevated pulmonary pressure.  INTERVENTIONAL RECOMMENDATIONS: CT of chest to r/o PE  Prepared and signed by  Topher Gomez MD    CT of chest negative for PE  Will monitor.

## 2017-11-01 NOTE — CONSULT NOTE ADULT - ATTENDING COMMENTS
Pt seen and evaluated by [facility] PA, agree with above assessment and plan pt does not require ICU LOC at this time please reconsult should condition warrant.

## 2017-11-01 NOTE — PROGRESS NOTE ADULT - PROBLEM SELECTOR PLAN 2
Now with ZHEN due to contrast induced nephropathy with ATN  Known h/o CKD Stage 3  Cr elevated s/p contrast from CTA x 2 and cardiac cath  C/w mucomyst  Hold IVF given CHF.  Renal consults appreciated

## 2017-11-02 LAB
ANION GAP SERPL CALC-SCNC: 15 MMOL/L — SIGNIFICANT CHANGE UP (ref 5–17)
BUN SERPL-MCNC: 63 MG/DL — HIGH (ref 8–20)
CALCIUM SERPL-MCNC: 8.7 MG/DL — SIGNIFICANT CHANGE UP (ref 8.6–10.2)
CHLORIDE SERPL-SCNC: 97 MMOL/L — LOW (ref 98–107)
CO2 SERPL-SCNC: 23 MMOL/L — SIGNIFICANT CHANGE UP (ref 22–29)
CREAT SERPL-MCNC: 3.08 MG/DL — HIGH (ref 0.5–1.3)
EOSINOPHIL NFR URNS MANUAL: SIGNIFICANT CHANGE UP
GLUCOSE SERPL-MCNC: 92 MG/DL — SIGNIFICANT CHANGE UP (ref 70–115)
HCT VFR BLD CALC: 40.7 % — LOW (ref 42–52)
HCT VFR BLD CALC: 44.6 % — SIGNIFICANT CHANGE UP (ref 42–52)
HGB BLD-MCNC: 14.1 G/DL — SIGNIFICANT CHANGE UP (ref 14–18)
HGB BLD-MCNC: 15.4 G/DL — SIGNIFICANT CHANGE UP (ref 14–18)
MAGNESIUM SERPL-MCNC: 2 MG/DL — SIGNIFICANT CHANGE UP (ref 1.6–2.6)
MCHC RBC-ENTMCNC: 33.5 PG — HIGH (ref 27–31)
MCHC RBC-ENTMCNC: 34.1 PG — HIGH (ref 27–31)
MCHC RBC-ENTMCNC: 34.5 G/DL — SIGNIFICANT CHANGE UP (ref 32–36)
MCHC RBC-ENTMCNC: 34.6 G/DL — SIGNIFICANT CHANGE UP (ref 32–36)
MCV RBC AUTO: 96.7 FL — HIGH (ref 80–94)
MCV RBC AUTO: 98.7 FL — HIGH (ref 80–94)
PLATELET # BLD AUTO: 131 K/UL — LOW (ref 150–400)
PLATELET # BLD AUTO: 131 K/UL — LOW (ref 150–400)
POTASSIUM SERPL-MCNC: 4.7 MMOL/L — SIGNIFICANT CHANGE UP (ref 3.5–5.3)
POTASSIUM SERPL-SCNC: 4.7 MMOL/L — SIGNIFICANT CHANGE UP (ref 3.5–5.3)
RBC # BLD: 4.21 M/UL — LOW (ref 4.6–6.2)
RBC # BLD: 4.52 M/UL — LOW (ref 4.6–6.2)
RBC # FLD: 13.1 % — SIGNIFICANT CHANGE UP (ref 11–15.6)
RBC # FLD: 13.2 % — SIGNIFICANT CHANGE UP (ref 11–15.6)
SODIUM SERPL-SCNC: 135 MMOL/L — SIGNIFICANT CHANGE UP (ref 135–145)
WBC # BLD: 12.6 K/UL — HIGH (ref 4.8–10.8)
WBC # BLD: 12.9 K/UL — HIGH (ref 4.8–10.8)
WBC # FLD AUTO: 12.6 K/UL — HIGH (ref 4.8–10.8)
WBC # FLD AUTO: 12.9 K/UL — HIGH (ref 4.8–10.8)

## 2017-11-02 PROCEDURE — 71010: CPT | Mod: 26

## 2017-11-02 PROCEDURE — 99233 SBSQ HOSP IP/OBS HIGH 50: CPT

## 2017-11-02 RX ORDER — FUROSEMIDE 40 MG
40 TABLET ORAL DAILY
Qty: 0 | Refills: 0 | Status: DISCONTINUED | OUTPATIENT
Start: 2017-11-02 | End: 2017-11-05

## 2017-11-02 RX ADMIN — ATORVASTATIN CALCIUM 40 MILLIGRAM(S): 80 TABLET, FILM COATED ORAL at 22:59

## 2017-11-02 RX ADMIN — HEPARIN SODIUM 5000 UNIT(S): 5000 INJECTION INTRAVENOUS; SUBCUTANEOUS at 23:00

## 2017-11-02 RX ADMIN — PANTOPRAZOLE SODIUM 40 MILLIGRAM(S): 20 TABLET, DELAYED RELEASE ORAL at 09:04

## 2017-11-02 RX ADMIN — HEPARIN SODIUM 5000 UNIT(S): 5000 INJECTION INTRAVENOUS; SUBCUTANEOUS at 05:31

## 2017-11-02 RX ADMIN — CLOPIDOGREL BISULFATE 75 MILLIGRAM(S): 75 TABLET, FILM COATED ORAL at 11:37

## 2017-11-02 RX ADMIN — HEPARIN SODIUM 5000 UNIT(S): 5000 INJECTION INTRAVENOUS; SUBCUTANEOUS at 14:08

## 2017-11-02 RX ADMIN — CARVEDILOL PHOSPHATE 6.25 MILLIGRAM(S): 80 CAPSULE, EXTENDED RELEASE ORAL at 05:31

## 2017-11-02 RX ADMIN — Medication 81 MILLIGRAM(S): at 11:37

## 2017-11-02 NOTE — PROGRESS NOTE ADULT - PROBLEM SELECTOR PLAN 2
Continuing to trend up.  Now with ZHEN due to contrast induced nephropathy with ATN  Known h/o CKD Stage 3  Cr elevated s/p contrast from CTA x 2 and cardiac cath  S/p mucomyst  Hold IVF given CHF.  Renal consults appreciated

## 2017-11-02 NOTE — PROGRESS NOTE ADULT - SUBJECTIVE AND OBJECTIVE BOX
NEPHROLOGY INTERVAL HPI/OVERNIGHT EVENTS: No new events.    MEDICATIONS  (STANDING):  aspirin enteric coated 81 milliGRAM(s) Oral daily  atorvastatin 40 milliGRAM(s) Oral at bedtime  carvedilol 6.25 milliGRAM(s) Oral every 12 hours  clopidogrel Tablet 75 milliGRAM(s) Oral daily  furosemide   Injectable 40 milliGRAM(s) IV Push daily  heparin  Injectable 5000 Unit(s) SubCutaneous every 8 hours  pantoprazole    Tablet 40 milliGRAM(s) Oral before breakfast    MEDICATIONS  (PRN):  morphine  - Injectable 2.5 milliGRAM(s) IV Push every 4 hours PRN Severe Pain (7 - 10)  ondansetron Injectable 4 milliGRAM(s) IV Push every 6 hours PRN Nausea and/or Vomiting      Allergies    enalapril (Hives)    Intolerances        Vital Signs Last 24 Hrs  T(C): 37.5 (2017 08:38), Max: 37.7 (2017 12:13)  T(F): 99.5 (2017 08:38), Max: 99.9 (2017 12:13)  HR: 56 (2017 08:16) (55 - 59)  BP: 82/56 (2017 08:16) (82/56 - 120/67)  BP(mean): 66 (2017 08:16) (66 - 66)  RR: 29 (2017 08:16) (19 - 29)  SpO2: 94% (2017 08:16) (93% - 100%)  Daily     Daily Weight in k.1 (2017 05:43)    PHYSICAL EXAM:    GENERAL: comfortable oob in chair.  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  NERVOUS SYSTEM:  mild decreased hearing  CHEST/LUNG: decreased bs bases, no wheezes  HEART: sternal scar, no rub  ABDOMEN:  not tender  EXTREMITIES:  no edema  LYMPH:   SKIN: no rash   ramos with yellow  urine, I&O  noted    LABS:                        15.7   17.2  )-----------( 144      ( 31 Oct 2017 23:38 )             46.0     11-    139  |  100  |  37.0<H>  ----------------------------<  151<H>  4.8   |  22.0  |  2.12<H>    Ca    9.1      2017 07:55  Phos  2.5     -  Mg     1.8             Urinalysis Basic - ( 31 Oct 2017 17:29 )    Color: Pale Yellow / Appearance: Clear / S.005 / pH: x  Gluc: x / Ketone: Negative  / Bili: Negative / Urobili: Negative mg/dL   Blood: x / Protein: Negative mg/dL / Nitrite: Negative   Leuk Esterase: Negative / RBC: 0-2 /HPF / WBC 0-2   Sq Epi: x / Non Sq Epi: Occasional / Bacteria: x              RADIOLOGY & ADDITIONAL TESTS:

## 2017-11-02 NOTE — PROGRESS NOTE ADULT - SUBJECTIVE AND OBJECTIVE BOX
[facility], THE HEART CENTER                                   [address] [place] [address]                                                      PHONE: [phone]                                                         FAX: [phone]  [email or web address]  ---------------------------------------------------------------------------------------------------------------------------------    Overnight events/patient complaints:  renal function worsening to 3    PAST MEDICAL & SURGICAL HISTORY:  Sick sinus syndrome  Coronary artery disease involving native heart, angina presence unspecified, unspecified vessel or lesion type  High cholesterol  History of open heart surgery: x2  H/O transfusion: x2 2016  H/O heart artery stent: x3 [id number]  History of appendectomy:   H/O cardiac pacemaker: 2016      enalapril (Hives)    MEDICATIONS  (STANDING):  aspirin enteric coated 81 milliGRAM(s) Oral daily  atorvastatin 40 milliGRAM(s) Oral at bedtime  carvedilol 6.25 milliGRAM(s) Oral every 12 hours  clopidogrel Tablet 75 milliGRAM(s) Oral daily  furosemide   Injectable 40 milliGRAM(s) IV Push daily  heparin  Injectable 5000 Unit(s) SubCutaneous every 8 hours  pantoprazole    Tablet 40 milliGRAM(s) Oral before breakfast    MEDICATIONS  (PRN):  morphine  - Injectable 2.5 milliGRAM(s) IV Push every 4 hours PRN Severe Pain (7 - 10)  ondansetron Injectable 4 milliGRAM(s) IV Push every 6 hours PRN Nausea and/or Vomiting      Vital Signs Last 24 Hrs  T(C): 36.8 (2017 16:02), Max: 37.5 (2017 00:00)  T(F): 98.2 (2017 16:02), Max: 99.5 (2017 00:00)  HR: 55 (2017 16:36) (55 - 58)  BP: 95/61 (2017 16:36) (82/56 - 120/67)  BP(mean): 68 (2017 11:33) (66 - 68)  RR: 27 (2017 16:36) (19 - 29)  SpO2: 96% (2017 16:36) (94% - 100%)  ICU Vital Signs Last 24 Hrs  SHEELA JOSEPH  I&O's Detail    2017 07:01  -  2017 07:00  --------------------------------------------------------  IN:    Oral Fluid: 720 mL  Total IN: 720 mL    OUT:    Indwelling Catheter - Urethral: 650 mL  Total OUT: 650 mL    Total NET: 70 mL      2017 07:  -  2017 17:17  --------------------------------------------------------  IN:    Oral Fluid: 720 mL  Total IN: 720 mL    OUT:    Indwelling Catheter - Urethral: 200 mL  Total OUT: 200 mL    Total NET: 520 mL        I&O's Summary    2017 07:  -  2017 07:00  --------------------------------------------------------  IN: 720 mL / OUT: 650 mL / NET: 70 mL    2017 07:  -  2017 17:17  --------------------------------------------------------  IN: 720 mL / OUT: 200 mL / NET: 520 mL      Drug Dosing Weight  SHEELA JOSEPH      PHYSICAL EXAM:  General: Appears well developed, well nourished alert and cooperative.  HEENT: Head; normocephalic, atraumatic.  Eyes: Pupils reactive, cornea wnl.  Neck: Supple, no nodes adenopathy, no NVD or carotid bruit or thyromegaly.  CARDIOVASCULAR: Normal S1 and S2, No murmur, rub, gallop or lift.   LUNGS: No rales, rhonchi or wheeze. Normal breath sounds bilaterally.  ABDOMEN: Soft, nontender without mass or organomegaly. bowel sounds normoactive.  EXTREMITIES: No clubbing, cyanosis or edema. Distal pulses wnl.   SKIN: warm and dry with normal turgor.  NEURO: Alert/oriented x 3/normal motor exam. No pathologic reflexes.    PSYCH: normal affect.        LABS:                        14.1   12.6  )-----------( 131      ( 2017 15:51 )             40.7     11-    135  |  97<L>  |  63.0<H>  ----------------------------<  92  4.7   |  23.0  |  3.08<H>    Ca    8.7      2017 11:35  Phos  2.5     11-  Mg     2.0           SHEELA JOSEPH        Urinalysis Basic - ( 31 Oct 2017 17:29 )    Color: Pale Yellow / Appearance: Clear / S.005 / pH: x  Gluc: x / Ketone: Negative  / Bili: Negative / Urobili: Negative mg/dL   Blood: x / Protein: Negative mg/dL / Nitrite: Negative   Leuk Esterase: Negative / RBC: 0-2 /HPF / WBC 0-2   Sq Epi: x / Non Sq Epi: Occasional / Bacteria: x      INTERPRETATION OF TELEMETRY (personally reviewed):     ECHO:     2D AND M-MODE MEASUREMENTS (normal ranges within parentheses):  Left                 Normal   Aorta/Left            Normal  Ventricle:                    Atrium:  IVSd (2D):    0.86  (0.7-1.1) Aortic Root  3.93 cm (2.4-3.7)                 cm             (2D):  LVPWd (2D):   0.72  (0.7-1.1) Left Atrium  5.10 cm (1.9-4.0)                 cm             (2D):  LVIDd (2D):   6.53  (3.4-5.7) LA Volume     33.8                 cm             Index         ml/m²  LVIDs (2D):   5.67                 cm  LV FS (2D):   13.2   (>25%)                  %  LV EF (2D):   28 %   (>55%)  Relative Wall 0.22   (<0.42)  Thickness     LV SYSTOLIC FUNCTION BY 2D PLANIMETRY (MOD):  EF-A4C View: 27.3 % EF-A2C View: 47.1 % EF-Biplane: 38.8 %     LV DIASTOLIC FUNCTION:  MV Peak E: 0.65 m/s e', MV Karena: 0.06 m/s  MV Peak A: 0.48 m/s E/e' Ratio: 10.67  E/A Ratio: 1.36     SPECTRAL DOPPLER ANALYSIS (where applicable):  Mitral Insufficiency by PISA:  MR Volume: 33.43 ml MR Flow Rate: 97.80 ml/s MR EROA: 22.74 mm²     Aortic Valve: AoV Max Tevin: 1.14 m/s AoV Peak P.2 mmHg AoV Mean PG:   3.0 mmHg     LVOT Vmax: 0.70 m/s LVOT VTI: 0.137 m LVOT Diameter: 2.17 cm     AoV Area, Vmax: 2.26 cm² AoV Area, VTI: 1.96 cm² AoV Area, Vmn: 2.34 cm²  Ao VTI: 0.259  Aortic Insufficiency:  AI Half-time: 620 msec     Tricuspid Valve and PA/RV Systolic Pressure: TR Max Velocity: 3.19 m/s   RA Pressure: 8 mmHg RVSP/PASP: 48.8 mmHg        PHYSICIAN INTERPRETATION:  Left Ventricle: The left ventricular internal cavity size is moderately   increased.  Global LV systolic function was moderately decreased. Left ventricular   ejection fraction, by visual estimation, is 35 to 40%.        LV Wall Scoring:  The basal and mid anterolateral wall, mid and apical inferior wall, and   basal  and mid inferolateral wall are akinetic.     Right Ventricle: The right ventricular size is normal. RV systolic   function is low normal.  Left Atrium: Normal left atrial size.  Right Atrium: The right atrium is normal in size.  Pericardium: There is no evidence of pericardial effusion.  Mitral Valve: Thickening of the anterior and posterior mitral valve   leaflets. Mitral leaflet mobility is normal. Mild to moderate mitral   valve regurgitation is seen.  Tricuspid Valve: Mild tricuspid regurgitation is visualized. Estimated   pulmonary artery systolic pressure is 48.8 mmHg assuming a right atrial   pressure of 8 mmHg, which is consistent with mild pulmonary hypertension.  Aortic Valve: The aortic valve is trileaflet. Sclerotic aortic valve with   normal opening. No significant aortic stenosis. Mild aortic valve   regurgitation is seen.  Pulmonic Valve: The pulmonic valve was not well visualized. Mild pulmonic   valve regurgitation.  Aorta: The aorta at the level of the sinus (3.93 cm) and ascending (3.93   cm) are dilated.     Venous: The inferior vena cava was dilated, with respiratory size   variation greater than 50%.  Additional Comments: A pacer wire is visualized in the right atrium and   right ventricle.        Summary:   1. Moderately decreased global left ventricular systolic function Left   ventricular ejection fraction, by visual estimation, is 35 to 40%. Mildly   reduced RV systolic function.   2. Basal and mid anterolateral wall, mid and apical inferior wall, and   basal and mid inferolateral wall are abnormal as described above.   3. Moderately increased left ventricular internal cavity size.   4. Mild to moderate mitral valve regurgitation.   5. Mild aortic regurgitation.   6. The aorta at the level of the sinus (3.93 cm) and ascending (3.93 cm)   are dilated.   7. Estimated pulmonary artery systolic pressure is 48.8 mmHg assuming a   right atrial pressure of 8 mmHg, which is consistent with mild pulmonary   hypertension.   8. There is no evidence of pericardial effusion.       CARDIAC CATHETERIZATION:  LIMA to courtney and the stents in LAD/RCA are patent.  Occluded OM (old?).  Elevated pulmonary pressures.  Med tx for CAD (NQWMI)      ASSESSMENT AND PLAN:  In summary, SHEELA JOSEPH is an 77y Male with past medical history significant for prior cad/mi/cabg and stents-remote-elevated troponins consistent with MI.     New onset systolic cardiomyopathy:  -s/p cath- no interventions   -pt to gently diuresed as per renal recs  -will likely require hydralazine/imdur prior to d/c for afterload reduction   -pt ruled out for PE  -pt will need to have renal function monitored given high contrast load - atleast 3-4 days of monitoring for katelyn  -continue current cardiac meds/dosages     Thank you for allowing [facility] to participate in the care of this patient.  Please feel free to call with any questions or concerns.

## 2017-11-02 NOTE — PROGRESS NOTE ADULT - ATTENDING COMMENTS
Does pt need proton pump inhibitor or could he be switched  to carafate  or pepcid ?  Monitor  labs function may get worse for awhile yet.

## 2017-11-03 DIAGNOSIS — N40.0 BENIGN PROSTATIC HYPERPLASIA WITHOUT LOWER URINARY TRACT SYMPTOMS: ICD-10-CM

## 2017-11-03 LAB
ANION GAP SERPL CALC-SCNC: 17 MMOL/L — SIGNIFICANT CHANGE UP (ref 5–17)
APPEARANCE UR: ABNORMAL
BILIRUB UR-MCNC: NEGATIVE — SIGNIFICANT CHANGE UP
BUN SERPL-MCNC: 80 MG/DL — HIGH (ref 8–20)
CALCIUM SERPL-MCNC: 8.6 MG/DL — SIGNIFICANT CHANGE UP (ref 8.6–10.2)
CHLORIDE SERPL-SCNC: 97 MMOL/L — LOW (ref 98–107)
CO2 SERPL-SCNC: 22 MMOL/L — SIGNIFICANT CHANGE UP (ref 22–29)
COLOR SPEC: YELLOW — SIGNIFICANT CHANGE UP
COMMENT - URINE: SIGNIFICANT CHANGE UP
CREAT SERPL-MCNC: 2.91 MG/DL — HIGH (ref 0.5–1.3)
DIFF PNL FLD: ABNORMAL
EPI CELLS # UR: SIGNIFICANT CHANGE UP
GLUCOSE SERPL-MCNC: 101 MG/DL — SIGNIFICANT CHANGE UP (ref 70–115)
GLUCOSE UR QL: NEGATIVE MG/DL — SIGNIFICANT CHANGE UP
HCT VFR BLD CALC: 42.4 % — SIGNIFICANT CHANGE UP (ref 42–52)
HGB BLD-MCNC: 14.7 G/DL — SIGNIFICANT CHANGE UP (ref 14–18)
KETONES UR-MCNC: NEGATIVE — SIGNIFICANT CHANGE UP
LEUKOCYTE ESTERASE UR-ACNC: ABNORMAL
MAGNESIUM SERPL-MCNC: 2.1 MG/DL — SIGNIFICANT CHANGE UP (ref 1.8–2.6)
MCHC RBC-ENTMCNC: 33.6 PG — HIGH (ref 27–31)
MCHC RBC-ENTMCNC: 34.7 G/DL — SIGNIFICANT CHANGE UP (ref 32–36)
MCV RBC AUTO: 97 FL — HIGH (ref 80–94)
NITRITE UR-MCNC: NEGATIVE — SIGNIFICANT CHANGE UP
PH UR: 5 — SIGNIFICANT CHANGE UP (ref 5–8)
PLATELET # BLD AUTO: 165 K/UL — SIGNIFICANT CHANGE UP (ref 150–400)
POTASSIUM SERPL-MCNC: 5.2 MMOL/L — SIGNIFICANT CHANGE UP (ref 3.5–5.3)
POTASSIUM SERPL-SCNC: 5.2 MMOL/L — SIGNIFICANT CHANGE UP (ref 3.5–5.3)
PROT UR-MCNC: 30 MG/DL
RBC # BLD: 4.37 M/UL — LOW (ref 4.6–6.2)
RBC # FLD: 12.8 % — SIGNIFICANT CHANGE UP (ref 11–15.6)
RBC CASTS # UR COMP ASSIST: SIGNIFICANT CHANGE UP /HPF (ref 0–4)
SODIUM SERPL-SCNC: 136 MMOL/L — SIGNIFICANT CHANGE UP (ref 135–145)
SP GR SPEC: 1.01 — SIGNIFICANT CHANGE UP (ref 1.01–1.02)
UROBILINOGEN FLD QL: NEGATIVE MG/DL — SIGNIFICANT CHANGE UP
WBC # BLD: 11.6 K/UL — HIGH (ref 4.8–10.8)
WBC # FLD AUTO: 11.6 K/UL — HIGH (ref 4.8–10.8)
WBC UR QL: SIGNIFICANT CHANGE UP

## 2017-11-03 PROCEDURE — 71010: CPT | Mod: 26

## 2017-11-03 PROCEDURE — 99233 SBSQ HOSP IP/OBS HIGH 50: CPT

## 2017-11-03 PROCEDURE — 93010 ELECTROCARDIOGRAM REPORT: CPT

## 2017-11-03 RX ORDER — TAMSULOSIN HYDROCHLORIDE 0.4 MG/1
0.4 CAPSULE ORAL AT BEDTIME
Qty: 0 | Refills: 0 | Status: DISCONTINUED | OUTPATIENT
Start: 2017-11-03 | End: 2017-11-05

## 2017-11-03 RX ADMIN — ATORVASTATIN CALCIUM 40 MILLIGRAM(S): 80 TABLET, FILM COATED ORAL at 22:20

## 2017-11-03 RX ADMIN — Medication 81 MILLIGRAM(S): at 11:45

## 2017-11-03 RX ADMIN — PANTOPRAZOLE SODIUM 40 MILLIGRAM(S): 20 TABLET, DELAYED RELEASE ORAL at 06:49

## 2017-11-03 RX ADMIN — HEPARIN SODIUM 5000 UNIT(S): 5000 INJECTION INTRAVENOUS; SUBCUTANEOUS at 06:49

## 2017-11-03 RX ADMIN — CARVEDILOL PHOSPHATE 6.25 MILLIGRAM(S): 80 CAPSULE, EXTENDED RELEASE ORAL at 06:49

## 2017-11-03 RX ADMIN — Medication 40 MILLIGRAM(S): at 08:09

## 2017-11-03 RX ADMIN — TAMSULOSIN HYDROCHLORIDE 0.4 MILLIGRAM(S): 0.4 CAPSULE ORAL at 22:20

## 2017-11-03 RX ADMIN — HEPARIN SODIUM 5000 UNIT(S): 5000 INJECTION INTRAVENOUS; SUBCUTANEOUS at 22:20

## 2017-11-03 RX ADMIN — CLOPIDOGREL BISULFATE 75 MILLIGRAM(S): 75 TABLET, FILM COATED ORAL at 11:45

## 2017-11-03 RX ADMIN — HEPARIN SODIUM 5000 UNIT(S): 5000 INJECTION INTRAVENOUS; SUBCUTANEOUS at 15:27

## 2017-11-03 NOTE — PROGRESS NOTE ADULT - PROBLEM SELECTOR PLAN 2
Peaked at 3.08 and trended down to 2.91  Bladder scan for retention  ZHEN due to contrast induced nephropathy with ATN  Known h/o CKD Stage 3  Cr elevated s/p contrast from CTA x 2 and cardiac cath  S/p mucomyst  Renal consults appreciated

## 2017-11-03 NOTE — PROGRESS NOTE ADULT - SUBJECTIVE AND OBJECTIVE BOX
SHEELA JOSEPH    [id number]    77y      Male    INTERVAL HPI/OVERNIGHT EVENTS: Had hypoxic events overnight. Pt currently states that it is now easier to breathe.     Hospital course:  78 yo M with h/o CAD, CABG, heart block s/p PPM, Phil' s esophagus, CKD Stage 3, oral ca s/p right mandible resection, CORNELIO presents with several hours of substernal dull chest pain associated with diaphoresis, 10/10, mildly relieved by nitro and morphine, radiates to back. Denies any sick contacts or recent travels States that he was in his usual state of healthy when he started experiencing the pain. In the ED, son and wife at bedside. CTA chest negative for dissection. Troponin elevated to 5.60 with CK 3652. Pt continued to experience chest pain that was mildly relieved by morphine. TTE showed EF 35-40%, basal and mid anterolateral wall, mid and apical inferior wall, and basal and mid inferolateral wall akinesis. Pt had urgent cardiac catheterization which showed that LIMA to courtney and stents in LAD /RCA were patent and a possibly chronically occluded OM with PCWP 32. Interventional cardiologist made recommendations for repeat CTA chest given concern for PE. Repeat CTA chest was negative for PE but showed new small b/l pleural effusions. MICU evaluated for acute CHF exacerbation in setting of renal failure. Pt developed respiratory distress, received lasix 80mg IVP, and was placed on nocturnal BiPAP. Pt was upgraded to stepdown. Creatinine peaked at 3.08 on day 3 of admission. Urbano was removed and started on flomax.       REVIEW OF SYSTEMS:    CONSTITUTIONAL: No fever  RESPIRATORY: No cough, wheezing, hemoptysis; No shortness of breath  CARDIOVASCULAR: No chest pain       Vital Signs Last 24 Hrs  T(C): 36.8 (2017 09:21), Max: 37.2 (2017 11:53)  T(F): 98.3 (2017 09:21), Max: 99 (2017 20:12)  HR: 57 (2017 08:18) (55 - 58) paced  BP: 98/67 (2017 08:18) (88/56 - 112/76)  BP(mean): 79 (2017 08:18) (68 - 86)  RR: 25 (2017 08:18) (20 - 27)  SpO2: 93% (2017 08:18) (93% - 96%) RA    PHYSICAL EXAM:    GENERAL: NAD, well-groomed, pleasant  HEENT: PERRL, +EOMI, MMM  CHEST/LUNG: decreased bs at bases  HEART: S1S2+, Regular rate and rhythm   ABDOMEN: Soft, Nontender, Nondistended; Bowel sounds present  EXTREMITIES:  2+ Peripheral Pulses      LABS:                        14.7   11.6  )-----------( 165      ( 2017 06:07 )             42.4     11    136  |  97<L>  |  80.0<H>  ----------------------------<  101  5.2   |  22.0  |  2.91<H>    Ca    8.6      2017 06:07  Mg     2.1     11-03        Urinalysis Basic - ( 2017 08:31 )    Color: x / Appearance: x / S.015 / pH: x  Gluc: x / Ketone: Negative  / Bili: Negative / Urobili: Negative mg/dL   Blood: x / Protein: 30 mg/dL / Nitrite: Negative   Leuk Esterase: Moderate / RBC: x / WBC x   Sq Epi: x / Non Sq Epi: x / Bacteria: x          MEDICATIONS  (STANDING):  aspirin enteric coated 81 milliGRAM(s) Oral daily  atorvastatin 40 milliGRAM(s) Oral at bedtime  carvedilol 6.25 milliGRAM(s) Oral every 12 hours  clopidogrel Tablet 75 milliGRAM(s) Oral daily  furosemide   Injectable 40 milliGRAM(s) IV Push daily  heparin  Injectable 5000 Unit(s) SubCutaneous every 8 hours  pantoprazole    Tablet 40 milliGRAM(s) Oral before breakfast  tamsulosin 0.4 milliGRAM(s) Oral at bedtime    MEDICATIONS  (PRN):  morphine  - Injectable 2.5 milliGRAM(s) IV Push every 4 hours PRN Severe Pain (7 - 10)  ondansetron Injectable 4 milliGRAM(s) IV Push every 6 hours PRN Nausea and/or Vomiting      RADIOLOGY & ADDITIONAL TESTS:

## 2017-11-03 NOTE — PROGRESS NOTE ADULT - SUBJECTIVE AND OBJECTIVE BOX
NEPHROLOGY INTERVAL HPI/OVERNIGHT EVENTS: No new events.    MEDICATIONS  (STANDING):  aspirin enteric coated 81 milliGRAM(s) Oral daily  atorvastatin 40 milliGRAM(s) Oral at bedtime  carvedilol 6.25 milliGRAM(s) Oral every 12 hours  clopidogrel Tablet 75 milliGRAM(s) Oral daily  furosemide   Injectable 40 milliGRAM(s) IV Push daily  heparin  Injectable 5000 Unit(s) SubCutaneous every 8 hours  pantoprazole    Tablet 40 milliGRAM(s) Oral before breakfast    MEDICATIONS  (PRN):  morphine  - Injectable 2.5 milliGRAM(s) IV Push every 4 hours PRN Severe Pain (7 - 10)  ondansetron Injectable 4 milliGRAM(s) IV Push every 6 hours PRN Nausea and/or Vomiting      Allergies    enalapril (Hives)    Intolerances        Vital Signs Last 24 Hrs    T(C): 37.2 (2017 05:13), Max: 37.5 (2017 08:38)  T(F): 99 (2017 05:13), Max: 99.5 (2017 08:38)  HR: 57 (2017 06:48) (55 - 58)  BP: 107/70 (2017 06:48) (82/56 - 112/76)  BP(mean): 86 (2017 00:28) (66 - 86)  RR: 21 (2017 04:30) (20 - 29)  SpO2: 96% (2017 04:30) (93% - 96%)  T(C): 37.5 (2017 08:38), Max: 37.7 (2017 12:13)  T(F): 99.5 (2017 08:38), Max: 99.9 (2017 12:13)  HR: 56 (2017 08:16) (55 - 59)  BP: 82/56 (2017 08:16) (82/56 - 120/67)  BP(mean): 66 (2017 08:16) (66 - 66)  RR: 29 (2017 08:16) (19 - 29)  SpO2: 94% (2017 08:16) (93% - 100%)    Daily Weight in k.1 (2017 05:43)    PHYSICAL EXAM:    GENERAL: comfortable oob in chair.  HEAD:  wnl  EYES:   ENMT:   NECK: same  NERVOUS SYSTEM:  same  CHEST/LUNG: decreased bs bases, no wheezes  HEART: sternal scar, no rub  ABDOMEN:  not tender  EXTREMITIES:  no edema  LYMPH:   SKIN: no rash   ramos with yellow  urine, I&O  noted    LABS:     136  |  97<L>  |  80.0<H>  ----------------------------<  101  5.2   |  22.0  |  2.91<H>    Ca    8.6      2017 06:07  Mg     2.1                                 15.7   17.2  )-----------( 144      ( 31 Oct 2017 23:38 )             46.0         139  |  100  |  37.0<H>  ----------------------------<  151<H>  4.8   |  22.0  |  2.12<H>    Ca    9.1      2017 07:55  Phos  2.5       Mg     1.8                   RADIOLOGY & ADDITIONAL TESTS:

## 2017-11-03 NOTE — PROGRESS NOTE ADULT - ASSESSMENT
78 yo M with h/o CAD, CABG, Phil's esophagus, CKD Stage 3, oral ca s/p right mandible resection, CORNELIO here with NSTEMI. Course complicated by contrast induced nephropathy with ATN and acute systolic CHF exacerbation. Physical therapy pending.

## 2017-11-03 NOTE — PROGRESS NOTE ADULT - SUBJECTIVE AND OBJECTIVE BOX
[facility], THE [facility]ENTER                                   [address] [place] [address]                                                      PHONE: [phone]                                                         FAX: [phone]  [email or web address]  ---------------------------------------------------------------------------------------------------------------------------------    Overnight events/patient complaints:  events noted, creat improving      enalapril (Hives)    MEDICATIONS  (STANDING):  aspirin enteric coated 81 milliGRAM(s) Oral daily  atorvastatin 40 milliGRAM(s) Oral at bedtime  carvedilol 6.25 milliGRAM(s) Oral every 12 hours  clopidogrel Tablet 75 milliGRAM(s) Oral daily  furosemide   Injectable 40 milliGRAM(s) IV Push daily  heparin  Injectable 5000 Unit(s) SubCutaneous every 8 hours  pantoprazole    Tablet 40 milliGRAM(s) Oral before breakfast  tamsulosin 0.4 milliGRAM(s) Oral at bedtime    MEDICATIONS  (PRN):  morphine  - Injectable 2.5 milliGRAM(s) IV Push every 4 hours PRN Severe Pain (7 - 10)  ondansetron Injectable 4 milliGRAM(s) IV Push every 6 hours PRN Nausea and/or Vomiting      Vital Signs Last 24 Hrs  T(C): 36.8 (2017 09:21), Max: 37.2 (2017 11:53)  T(F): 98.3 (2017 09:21), Max: 99 (2017 20:12)  HR: 57 (2017 08:18) (55 - 58)  BP: 98/67 (2017 08:18) (88/56 - 112/76)  BP(mean): 79 (2017 08:18) (68 - 86)  RR: 25 (2017 08:18) (20 - 27)  SpO2: 95% (2017 08:18) (93% - 96%)  Daily     Daily Weight in k.6 (2017 05:13)  ICU Vital Signs Last 24 Hrs  SHEELA JOSEPH  I&O's Detail    2017 07:01  -  2017 07:00  --------------------------------------------------------  IN:    Oral Fluid: 1010 mL  Total IN: 1010 mL    OUT:    Indwelling Catheter - Urethral: 400 mL    Voided: 300 mL  Total OUT: 700 mL    Total NET: 310 mL        I&O's Summary    2017 07:01  -  2017 07:00  --------------------------------------------------------  IN: 1010 mL / OUT: 700 mL / NET: 310 mL      Drug Dosing Weight  SHEELA JOSEPH      PHYSICAL EXAM:  General: Appears well developed, well nourished alert and cooperative.  HEENT: Head; normocephalic, atraumatic.  Eyes: Pupils reactive, cornea wnl.  Neck: Supple, no nodes adenopathy, no NVD or carotid bruit or thyromegaly.  CARDIOVASCULAR: Normal S1 and S2, No murmur, rub, gallop or lift.   LUNGS: No rales, rhonchi or wheeze. Normal breath sounds bilaterally.  ABDOMEN: Soft, nontender without mass or organomegaly. bowel sounds normoactive.  EXTREMITIES: No clubbing, cyanosis or edema. Distal pulses wnl.   SKIN: warm and dry with normal turgor.  NEURO: Alert/oriented x 3/normal motor exam. No pathologic reflexes.    PSYCH: normal affect.        LABS:                        14.7   11.6  )-----------( 165      ( 2017 06:07 )             42.4     11-03    136  |  97<L>  |  80.0<H>  ----------------------------<  101  5.2   |  22.0  |  2.91<H>    Ca    8.6      2017 06:07  Mg     2.1           SHEELA JOSEPH        Urinalysis Basic - ( 2017 08:31 )    Color: x / Appearance: x / S.015 / pH: x  Gluc: x / Ketone: Negative  / Bili: Negative / Urobili: Negative mg/dL   Blood: x / Protein: 30 mg/dL / Nitrite: Negative   Leuk Esterase: Moderate / RBC: x / WBC x   Sq Epi: x / Non Sq Epi: x / Bacteria: x        RADIOLOGY & ADDITIONAL STUDIES:< from: Xray Chest 1 View AP/PA. (17 @ 05:24) >  Impression: Improvement in CHF. Small left pleural effusion.    Status post CABG    < end of copied text >      INTERPRETATION OF TELEMETRY (personally reviewed):    ECG:< from: 12 Lead ECG (10.30.17 @ 14:02) >  Diagnosis Line Sinus rhythm with complete heart block and Ventricular-paced rhythm  Abnormal ECG    Confirmed by TRES SANCHEZ ([id number]) on 10/30/2017 5:55:15 PM    < end of copied text >      ECHO:< from: TTE Echo Complete w/Doppler (10.31.17 @ 08:43) >     Summary:   1. Moderately decreased global left ventricular systolic function Left   ventricular ejection fraction, by visual estimation, is 35 to 40%. Mildly   reduced RV systolic function.   2. Basal and mid anterolateral wall, mid and apical inferior wall, and   basal and mid inferolateral wall are abnormal as described above.   3. Moderately increased left ventricular internal cavity size.   4. Mild to moderate mitral valve regurgitation.   5. Mild aortic regurgitation.   6. The aorta at the level of the sinus (3.93 cm) and ascending (3.93 cm)   are dilated.   7. Estimated pulmonary artery systolic pressure is 48.8 mmHg assuming a   right atrial pressure of 8 mmHg, which is consistent with mild pulmonary   hypertension.   8. There is no evidence of pericardial effusion.   9. ** No prior echocardiograms available for comparison. Findings   discussed with Cath NP working with Dr. Gomez, who requested stat read.     Carie Alas DO Electronically signed on 10/31/2017 at 2:32:48 PM          *** Final ***      < end of copied text >  < from: TTE Echo Complete w/Doppler (10.31.17 @ 08:43) >     Summary:   1. Moderately decreased global left ventricular systolic function Left   ventricular ejection fraction, by visual estimation, is 35 to 40%. Mildly   reduced RV systolic function.   2. Basal and mid anterolateral wall, mid and apical inferior wall, and   basal and mid inferolateral wall are abnormal as described above.   3. Moderately increased left ventricular internal cavity size.   4. Mild to moderate mitral valve regurgitation.   5. Mild aortic regurgitation.   6. The aorta at the level of the sinus (3.93 cm) and ascending (3.93 cm)   are dilated.   7. Estimated pulmonary artery systolic pressure is 48.8 mmHg assuming a   right atrial pressure of 8 mmHg, which is consistent with mild pulmonary   hypertension.   8. There is no evidence of pericardial effusion.   9. ** No prior echocardiograms available for comparison. Findings   discussed with Cath NP working with Dr. Gomez, who requested stat read.     Caire Alas DO Electronically signed on 10/31/2017 at 2:32:48 PM          *** Final ***      < end of copied text >      Tele - SR with CHB and VVIR pacing    CARDIAC CATHETERIZATION: < from: Cardiac Cath Lab - Adult (10.31.17 @ 14:09) >  VENTRICLES: LV not done EF 35-40% with MR  CORONARY VESSELS: The coronary circulation is right dominant.  LM:   --  LM: Normal.  LAD:   --  Proximal LAD: There was a 0 % stenosis at the siteof a prior  stent.  --  Distal LAD: There was a 90 % stenosis in the distal third of the vessel  segment.  --  D1: There was a 100 % stenosis. There was good blood supply to the  distal myocardium from a graft.  CX:   --  OM1: There was a 100 % stenosis.  RCA:   --  Proximal RCA: There was a 0 % stenosis at the site of a prior  stent.  --  Mid RCA: There was a 25 % stenosis.  GRAFTS:   --  Graft to the 1st diagonal: The graft was a LIMA. It was  normal.  AORTA: Trace AI. No other patent graft  COMPLICATIONS: There were no complications.  DIAGNOSTIC IMPRESSIONS: LIMA to courtney and stents in LAD /RCA are patent.  Occluded OM (old?) with severely elevated pulmonary pressure.  DIAGNOSTIC RECOMMENDATIONS: CT of chest to r/o PE The patient should  continue with the present medications for CAD  INTERVENTIONAL IMPRESSIONS: LIMA to courtney and stents in LAD /RCA are patent.  Occluded OM (old?) with severely elevated pulmonary pressure.  INTERVENTIONAL RECOMMENDATIONS: CT of chest to r/o PE  Prepared and signed by  Topher Gomez MD    < end of copied text >

## 2017-11-03 NOTE — PROGRESS NOTE ADULT - ASSESSMENT
Assessment  ischemic CM Ef 35%-40% mild MR s/p cabg neg cath this admission  CRI with worsening creat after dye load  VVI pacer with underlying CHB      Rec  cont current meds  trend creat  consider doppler renal arteries to R/O CULLEN given h/o unequal sized kidneys  pacer eval today ? reprogram to DDD

## 2017-11-04 LAB
ANION GAP SERPL CALC-SCNC: 13 MMOL/L — SIGNIFICANT CHANGE UP (ref 5–17)
APPEARANCE UR: CLEAR — SIGNIFICANT CHANGE UP
BILIRUB UR-MCNC: NEGATIVE — SIGNIFICANT CHANGE UP
BUN SERPL-MCNC: 82 MG/DL — HIGH (ref 8–20)
CALCIUM SERPL-MCNC: 8.6 MG/DL — SIGNIFICANT CHANGE UP (ref 8.6–10.2)
CHLORIDE SERPL-SCNC: 100 MMOL/L — SIGNIFICANT CHANGE UP (ref 98–107)
CO2 SERPL-SCNC: 24 MMOL/L — SIGNIFICANT CHANGE UP (ref 22–29)
COLOR SPEC: YELLOW — SIGNIFICANT CHANGE UP
CREAT SERPL-MCNC: 2.51 MG/DL — HIGH (ref 0.5–1.3)
DIFF PNL FLD: ABNORMAL
EPI CELLS # UR: SIGNIFICANT CHANGE UP
GLUCOSE SERPL-MCNC: 106 MG/DL — SIGNIFICANT CHANGE UP (ref 70–115)
GLUCOSE UR QL: NEGATIVE MG/DL — SIGNIFICANT CHANGE UP
HCT VFR BLD CALC: 40.4 % — LOW (ref 42–52)
HGB BLD-MCNC: 13.8 G/DL — LOW (ref 14–18)
KETONES UR-MCNC: NEGATIVE — SIGNIFICANT CHANGE UP
LEUKOCYTE ESTERASE UR-ACNC: NEGATIVE — SIGNIFICANT CHANGE UP
MAGNESIUM SERPL-MCNC: 2.2 MG/DL — SIGNIFICANT CHANGE UP (ref 1.6–2.6)
MCHC RBC-ENTMCNC: 33.2 PG — HIGH (ref 27–31)
MCHC RBC-ENTMCNC: 34.2 G/DL — SIGNIFICANT CHANGE UP (ref 32–36)
MCV RBC AUTO: 97.1 FL — HIGH (ref 80–94)
NITRITE UR-MCNC: NEGATIVE — SIGNIFICANT CHANGE UP
PH UR: 5 — SIGNIFICANT CHANGE UP (ref 5–8)
PLATELET # BLD AUTO: 192 K/UL — SIGNIFICANT CHANGE UP (ref 150–400)
POTASSIUM SERPL-MCNC: 4.3 MMOL/L — SIGNIFICANT CHANGE UP (ref 3.5–5.3)
POTASSIUM SERPL-SCNC: 4.3 MMOL/L — SIGNIFICANT CHANGE UP (ref 3.5–5.3)
PROT UR-MCNC: 15 MG/DL
RBC # BLD: 4.16 M/UL — LOW (ref 4.6–6.2)
RBC # FLD: 12.2 % — SIGNIFICANT CHANGE UP (ref 11–15.6)
RBC CASTS # UR COMP ASSIST: SIGNIFICANT CHANGE UP /HPF (ref 0–4)
SODIUM SERPL-SCNC: 137 MMOL/L — SIGNIFICANT CHANGE UP (ref 135–145)
SP GR SPEC: 1.01 — SIGNIFICANT CHANGE UP (ref 1.01–1.02)
UROBILINOGEN FLD QL: 1 MG/DL
WBC # BLD: 9.8 K/UL — SIGNIFICANT CHANGE UP (ref 4.8–10.8)
WBC # FLD AUTO: 9.8 K/UL — SIGNIFICANT CHANGE UP (ref 4.8–10.8)
WBC UR QL: NEGATIVE — SIGNIFICANT CHANGE UP

## 2017-11-04 PROCEDURE — 99233 SBSQ HOSP IP/OBS HIGH 50: CPT

## 2017-11-04 RX ADMIN — HEPARIN SODIUM 5000 UNIT(S): 5000 INJECTION INTRAVENOUS; SUBCUTANEOUS at 14:43

## 2017-11-04 RX ADMIN — Medication 40 MILLIGRAM(S): at 05:11

## 2017-11-04 RX ADMIN — TAMSULOSIN HYDROCHLORIDE 0.4 MILLIGRAM(S): 0.4 CAPSULE ORAL at 21:46

## 2017-11-04 RX ADMIN — Medication 81 MILLIGRAM(S): at 12:22

## 2017-11-04 RX ADMIN — HEPARIN SODIUM 5000 UNIT(S): 5000 INJECTION INTRAVENOUS; SUBCUTANEOUS at 05:12

## 2017-11-04 RX ADMIN — PANTOPRAZOLE SODIUM 40 MILLIGRAM(S): 20 TABLET, DELAYED RELEASE ORAL at 05:11

## 2017-11-04 RX ADMIN — CLOPIDOGREL BISULFATE 75 MILLIGRAM(S): 75 TABLET, FILM COATED ORAL at 12:22

## 2017-11-04 RX ADMIN — ATORVASTATIN CALCIUM 40 MILLIGRAM(S): 80 TABLET, FILM COATED ORAL at 21:46

## 2017-11-04 RX ADMIN — HEPARIN SODIUM 5000 UNIT(S): 5000 INJECTION INTRAVENOUS; SUBCUTANEOUS at 21:47

## 2017-11-04 NOTE — PROGRESS NOTE ADULT - ASSESSMENT
ZHEN on CKD --> Improved   RCN +/- ATN   Patient had a renal artery doppler done 10-5-17 that did not show CULLEN  studies and cath noted   repeat labs in am   watch on the current Lasix , can convert to po tomorrow   continue to hold Losartan

## 2017-11-04 NOTE — PROGRESS NOTE ADULT - PROBLEM SELECTOR PROBLEM 1
Acute on chronic systolic congestive heart failure
NSTEMI (non-ST elevated myocardial infarction)
Acute on chronic systolic congestive heart failure

## 2017-11-04 NOTE — PROGRESS NOTE ADULT - PROBLEM SELECTOR PROBLEM 4
High cholesterol
Essential hypertension
BPH (benign prostatic hyperplasia)
BPH (benign prostatic hyperplasia)
Essential hypertension

## 2017-11-04 NOTE — PROGRESS NOTE ADULT - SUBJECTIVE AND OBJECTIVE BOX
Internal Medicine Hospitalist - Dr. Kamille JOSEPH    [id number]    77y      Male    Patient is a 77y old  Male who presents with a chief complaint of Chest pain (30 Oct 2017 19:16)    INTERVAL HPI/ OVERNIGHT EVENTS: Patient is seen and examined, report feeling better, denied chest pain, mild exertional SOB    REVIEW OF SYSTEMS:    Denied fever, chills, abd. pain, nausea, vomiting, chest pain,  headache, dizziness    PHYSICAL EXAM:    Vital Signs Last 24 Hrs  T(C): 36.7 (2017 05:07), Max: 37.4 (2017 23:59)  T(F): 98.1 (2017 05:07), Max: 99.3 (2017 23:59)  HR: 57 (2017 05:07) (55 - 72)  BP: 108/58 (2017 05:07) (95/70 - 112/60)  BP(mean): 74 (2017 15:53) (74 - 74)  RR: 20 (2017 05:07) (17 - 25)  SpO2: 95% (2017 05:07) (93% - 97%)    GENERAL: NAD  HEENT: EOMI  Neck: supple  CHEST/LUNG: improving air entry   HEART: S1S2+ audible  ABDOMEN: Soft, Nontender, Nondistended; Bowel sounds present  EXTREMITIES:  no edema  CNS: AAO X 3    LABS:                        13.8   9.8   )-----------( 192      ( 2017 05:34 )             40.4     11-04    137  |  100  |  82.0<H>  ----------------------------<  106  4.3   |  24.0  |  2.51<H>    Ca    8.6      2017 05:34  Mg     2.2     11-04        Urinalysis Basic - ( 2017 02:24 )    Color: Yellow / Appearance: Clear / S.015 / pH: x  Gluc: x / Ketone: Negative  / Bili: Negative / Urobili: 1 mg/dL   Blood: x / Protein: 15 mg/dL / Nitrite: Negative   Leuk Esterase: Negative / RBC: 0-2 /HPF / WBC Negative   Sq Epi: x / Non Sq Epi: Occasional / Bacteria: x          MEDICATIONS  (STANDING):  aspirin enteric coated 81 milliGRAM(s) Oral daily  atorvastatin 40 milliGRAM(s) Oral at bedtime  carvedilol 6.25 milliGRAM(s) Oral every 12 hours  clopidogrel Tablet 75 milliGRAM(s) Oral daily  furosemide   Injectable 40 milliGRAM(s) IV Push daily  heparin  Injectable 5000 Unit(s) SubCutaneous every 8 hours  pantoprazole    Tablet 40 milliGRAM(s) Oral before breakfast  tamsulosin 0.4 milliGRAM(s) Oral at bedtime    MEDICATIONS  (PRN):  morphine  - Injectable 2.5 milliGRAM(s) IV Push every 4 hours PRN Severe Pain (7 - 10)  ondansetron Injectable 4 milliGRAM(s) IV Push every 6 hours PRN Nausea and/or Vomiting      RADIOLOGY & ADDITIONAL TEST    < from: Xray Chest 1 View AP/PA. (17 @ 11:07) >  Impression:    Minimal pulmonary vascular congestion with trace to small bilateral   pleural effusions left greater than right with left basilar atelectasis    < end of copied text >

## 2017-11-04 NOTE — PROGRESS NOTE ADULT - PROBLEM SELECTOR PLAN 1
EF 35-40% with volume overload  In setting of ATN, will gently diurese - lasix 40mg IVP qd  Will need afterload reduction prior to d/c - imdur
EF 35-40%  improving renal function, per renal c/w lasix 40mg IVP qd  Will need afterload reduction prior to d/c - imdur  CXR showed minimal vascular congestion
EF 35-40% with volume overload  In setting of ATN, will gently diurese - lasix 40mg IVP qd  Will need afterload reduction prior to d/c - imdur  CXR today to eval CHF
EF 35-40% with volume overload  In setting of ATN, will gently diurese as per renal recs  Will need afterload reduction prior to d/c - imdur
Troponins elevated 3.37  TTE pending  Plan for cardiac cath in AM  C/w asa and plavix  Morphine PRN pain control  Cards cs appreciated

## 2017-11-04 NOTE — PROGRESS NOTE ADULT - PROBLEM SELECTOR PLAN 2
Peaked at 3.08 and trended down to 2.5   ZHEN due to contrast induced nephropathy with ATN  Known h/o CKD Stage 3  S/p mucomyst  Renal consults appreciated Peaked at 3.08 and trended down to 2.5   ZHEN due to contrast induced nephropathy with ATN  Known h/o CKD Stage 3  S/p mucomyst  Renal consults appreciated - Patient had a renal artery doppler done 10-5-17 that did not show CULLEN

## 2017-11-04 NOTE — PROGRESS NOTE ADULT - PROBLEM SELECTOR PROBLEM 2
ATN (acute tubular necrosis)
CKD (chronic kidney disease) stage 3, GFR 30-59 ml/min

## 2017-11-04 NOTE — PROGRESS NOTE ADULT - PROBLEM SELECTOR PLAN 4
C/w statin
Normotensive  Holding amlodipine and losartan in setting of renal failure to allow for increased blood perfusion to kidneys
Normotensive  Holding amlodipine and losartan in setting of renal failure to allow for increased blood perfusion to kidneys
S/p ramos removal   Start flomax  Bladder scan
S/p ramos removal   c/w flomax  Bladder scan

## 2017-11-04 NOTE — PROGRESS NOTE ADULT - PROBLEM SELECTOR PROBLEM 3
Essential hypertension
NSTEMI (non-ST elevated myocardial infarction)

## 2017-11-04 NOTE — PROGRESS NOTE ADULT - ASSESSMENT
78 yo M with h/o CAD, CABG, Phil's esophagus, CKD Stage 3, oral ca s/p right mandible resection, CORNELIO here with NSTEMI. Course complicated by contrast induced nephropathy with ATN and acute systolic CHF exacerbation, feeling better, improving renal function

## 2017-11-04 NOTE — PROGRESS NOTE ADULT - SUBJECTIVE AND OBJECTIVE BOX
NEPHROLOGY INTERVAL HPI/OVERNIGHT EVENTS:    Feels better   Cardio follow up noted   remains on IV LAsix   Less edema     MEDICATIONS  (STANDING):  aspirin enteric coated 81 milliGRAM(s) Oral daily  atorvastatin 40 milliGRAM(s) Oral at bedtime  carvedilol 6.25 milliGRAM(s) Oral every 12 hours  clopidogrel Tablet 75 milliGRAM(s) Oral daily  furosemide   Injectable 40 milliGRAM(s) IV Push daily  heparin  Injectable 5000 Unit(s) SubCutaneous every 8 hours  pantoprazole    Tablet 40 milliGRAM(s) Oral before breakfast  tamsulosin 0.4 milliGRAM(s) Oral at bedtime    MEDICATIONS  (PRN):  morphine  - Injectable 2.5 milliGRAM(s) IV Push every 4 hours PRN Severe Pain (7 - 10)  ondansetron Injectable 4 milliGRAM(s) IV Push every 6 hours PRN Nausea and/or Vomiting      Allergies    enalapril (Hives)    Intolerances        Vital Signs Last 24 Hrs  T(C): 36.7 (2017 05:07), Max: 37.4 (2017 23:59)  T(F): 98.1 (2017 05:07), Max: 99.3 (2017 23:59)  HR: 57 (2017 05:07) (55 - 72)  BP: 108/58 (2017 05:07) (95/70 - 112/60)  BP(mean): 74 (2017 15:53) (74 - 74)  RR: 20 (2017 05:07) (17 - 25)  SpO2: 95% (2017 05:07) (93% - 97%)  Daily     Daily   I&O's Detail    2017 07:01  -  2017 07:00  --------------------------------------------------------  IN:    Oral Fluid: 200 mL  Total IN: 200 mL    OUT:    Voided: 1225 mL  Total OUT: 1225 mL    Total NET: -1025 mL      2017 08:01  -  2017 10:38  --------------------------------------------------------  IN:  Total IN: 0 mL    OUT:    Voided: 900 mL  Total OUT: 900 mL    Total NET: -900 mL        I&O's Summary    2017 07:01  -  2017 07:00  --------------------------------------------------------  IN: 200 mL / OUT: 1225 mL / NET: -1025 mL    2017 08:01  -  2017 10:38  --------------------------------------------------------  IN: 0 mL / OUT: 900 mL / NET: -900 mL        PHYSICAL EXAM:  NECK: Supple, No JVD/Bruit, Normal thyroid  NERVOUS SYSTEM:  A/O x3,   CHEST No rales, rhonchi,  or rubs  HEART: Regular rate and rhythm; No murmurs, rubs, or gallops  ABDOMEN: Soft, NT/ND BS+  EXTREMITIES:  less edema     LABS:                        13.8   9.8   )-----------( 192      ( 2017 05:34 )             40.4     11-04    137  |  100  |  82.0<H>  ----------------------------<  106  4.3   |  24.0  |  2.51<H>    Ca    8.6      2017 05:34  Mg     2.2     11-04        Urinalysis Basic - ( 2017 02:24 )    Color: Yellow / Appearance: Clear / S.015 / pH: x  Gluc: x / Ketone: Negative  / Bili: Negative / Urobili: 1 mg/dL   Blood: x / Protein: 15 mg/dL / Nitrite: Negative   Leuk Esterase: Negative / RBC: 0-2 /HPF / WBC Negative   Sq Epi: x / Non Sq Epi: Occasional / Bacteria: x      Magnesium, Serum: 2.2 mg/dL ( @ 05:34)          RADIOLOGY & ADDITIONAL TESTS:

## 2017-11-05 ENCOUNTER — TRANSCRIPTION ENCOUNTER (OUTPATIENT)
Age: 77
End: 2017-11-05

## 2017-11-05 VITALS
TEMPERATURE: 98 F | DIASTOLIC BLOOD PRESSURE: 60 MMHG | SYSTOLIC BLOOD PRESSURE: 98 MMHG | OXYGEN SATURATION: 95 % | HEART RATE: 90 BPM | RESPIRATION RATE: 16 BRPM

## 2017-11-05 LAB
ANION GAP SERPL CALC-SCNC: 15 MMOL/L — SIGNIFICANT CHANGE UP (ref 5–17)
BUN SERPL-MCNC: 64 MG/DL — HIGH (ref 8–20)
CALCIUM SERPL-MCNC: 9.1 MG/DL — SIGNIFICANT CHANGE UP (ref 8.6–10.2)
CHLORIDE SERPL-SCNC: 100 MMOL/L — SIGNIFICANT CHANGE UP (ref 98–107)
CO2 SERPL-SCNC: 24 MMOL/L — SIGNIFICANT CHANGE UP (ref 22–29)
CREAT SERPL-MCNC: 1.91 MG/DL — HIGH (ref 0.5–1.3)
GLUCOSE SERPL-MCNC: 117 MG/DL — HIGH (ref 70–115)
HCT VFR BLD CALC: 41.4 % — LOW (ref 42–52)
HGB BLD-MCNC: 14.6 G/DL — SIGNIFICANT CHANGE UP (ref 14–18)
MAGNESIUM SERPL-MCNC: 2.2 MG/DL — SIGNIFICANT CHANGE UP (ref 1.6–2.6)
MCHC RBC-ENTMCNC: 33.6 PG — HIGH (ref 27–31)
MCHC RBC-ENTMCNC: 35.3 G/DL — SIGNIFICANT CHANGE UP (ref 32–36)
MCV RBC AUTO: 95.2 FL — HIGH (ref 80–94)
PLATELET # BLD AUTO: 217 K/UL — SIGNIFICANT CHANGE UP (ref 150–400)
POTASSIUM SERPL-MCNC: 4.3 MMOL/L — SIGNIFICANT CHANGE UP (ref 3.5–5.3)
POTASSIUM SERPL-SCNC: 4.3 MMOL/L — SIGNIFICANT CHANGE UP (ref 3.5–5.3)
RBC # BLD: 4.35 M/UL — LOW (ref 4.6–6.2)
RBC # FLD: 12.3 % — SIGNIFICANT CHANGE UP (ref 11–15.6)
SODIUM SERPL-SCNC: 139 MMOL/L — SIGNIFICANT CHANGE UP (ref 135–145)
WBC # BLD: 8.7 K/UL — SIGNIFICANT CHANGE UP (ref 4.8–10.8)
WBC # FLD AUTO: 8.7 K/UL — SIGNIFICANT CHANGE UP (ref 4.8–10.8)

## 2017-11-05 PROCEDURE — 80048 BASIC METABOLIC PNL TOTAL CA: CPT

## 2017-11-05 PROCEDURE — 84100 ASSAY OF PHOSPHORUS: CPT

## 2017-11-05 PROCEDURE — 87205 SMEAR GRAM STAIN: CPT

## 2017-11-05 PROCEDURE — 71045 X-RAY EXAM CHEST 1 VIEW: CPT

## 2017-11-05 PROCEDURE — 93306 TTE W/DOPPLER COMPLETE: CPT

## 2017-11-05 PROCEDURE — 96376 TX/PRO/DX INJ SAME DRUG ADON: CPT | Mod: XU

## 2017-11-05 PROCEDURE — 94760 N-INVAS EAR/PLS OXIMETRY 1: CPT

## 2017-11-05 PROCEDURE — 82550 ASSAY OF CK (CPK): CPT

## 2017-11-05 PROCEDURE — 96374 THER/PROPH/DIAG INJ IV PUSH: CPT | Mod: XU

## 2017-11-05 PROCEDURE — 80053 COMPREHEN METABOLIC PANEL: CPT

## 2017-11-05 PROCEDURE — 83735 ASSAY OF MAGNESIUM: CPT

## 2017-11-05 PROCEDURE — 81001 URINALYSIS AUTO W/SCOPE: CPT

## 2017-11-05 PROCEDURE — C1769: CPT

## 2017-11-05 PROCEDURE — 84300 ASSAY OF URINE SODIUM: CPT

## 2017-11-05 PROCEDURE — 99285 EMERGENCY DEPT VISIT HI MDM: CPT | Mod: 25

## 2017-11-05 PROCEDURE — C1894: CPT

## 2017-11-05 PROCEDURE — 97163 PT EVAL HIGH COMPLEX 45 MIN: CPT

## 2017-11-05 PROCEDURE — 99231 SBSQ HOSP IP/OBS SF/LOW 25: CPT

## 2017-11-05 PROCEDURE — 85027 COMPLETE CBC AUTOMATED: CPT

## 2017-11-05 PROCEDURE — 85730 THROMBOPLASTIN TIME PARTIAL: CPT

## 2017-11-05 PROCEDURE — 93461 R&L HRT ART/VENTRICLE ANGIO: CPT

## 2017-11-05 PROCEDURE — 82553 CREATINE MB FRACTION: CPT

## 2017-11-05 PROCEDURE — C1889: CPT

## 2017-11-05 PROCEDURE — C1887: CPT

## 2017-11-05 PROCEDURE — 71275 CT ANGIOGRAPHY CHEST: CPT

## 2017-11-05 PROCEDURE — 93005 ELECTROCARDIOGRAM TRACING: CPT

## 2017-11-05 PROCEDURE — 93567 NJX CAR CTH SPRVLV AORTGRPHY: CPT

## 2017-11-05 PROCEDURE — 99239 HOSP IP/OBS DSCHRG MGMT >30: CPT

## 2017-11-05 PROCEDURE — 85610 PROTHROMBIN TIME: CPT

## 2017-11-05 PROCEDURE — 84484 ASSAY OF TROPONIN QUANT: CPT

## 2017-11-05 PROCEDURE — 36415 COLL VENOUS BLD VENIPUNCTURE: CPT

## 2017-11-05 PROCEDURE — 94660 CPAP INITIATION&MGMT: CPT

## 2017-11-05 PROCEDURE — 83935 ASSAY OF URINE OSMOLALITY: CPT

## 2017-11-05 PROCEDURE — 99221 1ST HOSP IP/OBS SF/LOW 40: CPT

## 2017-11-05 RX ORDER — FUROSEMIDE 40 MG
1 TABLET ORAL
Qty: 30 | Refills: 0 | OUTPATIENT
Start: 2017-11-05 | End: 2017-12-05

## 2017-11-05 RX ORDER — TAMSULOSIN HYDROCHLORIDE 0.4 MG/1
1 CAPSULE ORAL
Qty: 30 | Refills: 0 | OUTPATIENT
Start: 2017-11-05 | End: 2017-12-05

## 2017-11-05 RX ORDER — CARVEDILOL PHOSPHATE 80 MG/1
1 CAPSULE, EXTENDED RELEASE ORAL
Qty: 60 | Refills: 0 | OUTPATIENT
Start: 2017-11-05 | End: 2017-12-05

## 2017-11-05 RX ORDER — AMLODIPINE BESYLATE 2.5 MG/1
1 TABLET ORAL
Qty: 0 | Refills: 0 | COMMUNITY

## 2017-11-05 RX ORDER — LOSARTAN POTASSIUM 100 MG/1
1 TABLET, FILM COATED ORAL
Qty: 0 | Refills: 0 | COMMUNITY

## 2017-11-05 RX ADMIN — HEPARIN SODIUM 5000 UNIT(S): 5000 INJECTION INTRAVENOUS; SUBCUTANEOUS at 07:07

## 2017-11-05 RX ADMIN — PANTOPRAZOLE SODIUM 40 MILLIGRAM(S): 20 TABLET, DELAYED RELEASE ORAL at 05:15

## 2017-11-05 RX ADMIN — CLOPIDOGREL BISULFATE 75 MILLIGRAM(S): 75 TABLET, FILM COATED ORAL at 11:44

## 2017-11-05 RX ADMIN — Medication 40 MILLIGRAM(S): at 05:16

## 2017-11-05 RX ADMIN — Medication 81 MILLIGRAM(S): at 11:44

## 2017-11-05 RX ADMIN — CARVEDILOL PHOSPHATE 6.25 MILLIGRAM(S): 80 CAPSULE, EXTENDED RELEASE ORAL at 05:15

## 2017-11-05 NOTE — DISCHARGE NOTE ADULT - SECONDARY DIAGNOSIS.
ATN (acute tubular necrosis) Coronary artery disease involving native heart, angina presence unspecified, unspecified vessel or lesion type BPH (benign prostatic hyperplasia) NSTEMI (non-ST elevated myocardial infarction) Essential hypertension Moreno esophagus

## 2017-11-05 NOTE — DISCHARGE NOTE ADULT - ADDITIONAL INSTRUCTIONS
F/U primary care, cardiology, renal  F/U BMP in 1 week, if creatinine is back to baseline, consider restarting losartan

## 2017-11-05 NOTE — PROGRESS NOTE ADULT - ASSESSMENT
ZHEN on CKD --> Improved   RCN +/- ATN   Patient had a renal artery doppler done 10-5-17 that did not show CULLEN  studies and cath noted   repeat labs in am   watch on the current Lasix , can convert to po   continue to hold Losartan     Will follow up with PMD Dr Castro for repeat labs

## 2017-11-05 NOTE — DISCHARGE NOTE ADULT - HOSPITAL COURSE
78 yo M with h/o CAD, CABG, heart block s/p PPM, Phil' s esophagus, CKD Stage 3, oral ca s/p right mandible resection, CORNELIO presents with several hours of substernal dull chest pain associated with diaphoresis, 10/10, mildly relieved by nitro and morphine, radiates to back. Denies any sick contacts or recent travels States that he was in his usual state of healthy when he started experiencing the pain. In the ED, son and wife at bedside. CTA chest negative for dissection. Troponin elevated to 5.60 with CK 3652. Pt continued to experience chest pain that was mildly relieved by morphine. TTE showed EF 35-40%, basal and mid anterolateral wall, mid and apical inferior wall, and basal and mid inferolateral wall akinesis. Pt had urgent cardiac catheterization which showed that LIMA to courtney and stents in LAD /RCA were patent and a possibly chronically occluded OM with PCWP 32. Interventional cardiologist made recommendations for repeat CTA chest given concern for PE. Repeat CTA chest was negative for PE but showed new small b/l pleural effusions. MICU evaluated for acute CHF exacerbation in setting of renal failure. Pt developed respiratory distress, received lasix 80mg IVP, and was placed on nocturnal BiPAP. Pt was upgraded to stepdown. Creatinine peaked at 3.08 on day 3 of admission. Ramos was removed and started on flomax.  Creatinine improving, most recent CR is 1.9, his baseline creatinine is around 1.4. Pt is feeling better, saturating well on room air, ambulating, repeat CXR showed minimal vascular congestion. . Discussed with Dr. Patel and Dr. King, ok to discharge home on lasix 40 daily, holding losartan for now, suggest to f/u BMP in 1 week and if creatinine is back to baseline, consider restarting losartan.       ATN (acute tubular necrosis).  Plan: Peaked at 3.08 and trended down to 1.9   ZHEN due to contrast induced nephropathy with ATN  Known h/o CKD Stage 3  S/p mucomyst  Renal consults appreciated - Patient had a renal artery doppler done 10-5-17 that did not show CULLEN.     NSTEMI (non-ST elevated myocardial infarction).  Plan: Cardiac cath showed patent stents to LAD/RCA, patent LIMA to diag, and 100% occlusion to OM  TTE reviewed: basal and mid anterolateral wall, mid and apical inferior wall, and basal and mid inferolateral wall akinesis.   C/w asa, plavix, atorvastatin, and metoprolol     BPH (benign prostatic hyperplasia).  Plan: S/p ramos removal   c/w flomax  Bladder scan.     Essential hypertension.  Plan: Normotensive  Holding amlodipine and losartan in setting of renal failure to allow for increased blood perfusion to kidneys  c/w coreg and lasix.   monitor BP at home, repeat BMP in 1 week, if creatinine is back to baseline, consider restarting losartan     Pt is seen and examined, feeling better, saturating well on room air, ambulating.   ICU Vital Signs Last 24 Hrs  T(C): 37.1 (05 Nov 2017 10:44), Max: 37.7 (04 Nov 2017 17:26)  T(F): 98.7 (05 Nov 2017 10:44), Max: 99.9 (04 Nov 2017 17:26)  HR: 90 (05 Nov 2017 10:44) (60 - 90)  BP: 103/61 (05 Nov 2017 10:44) (103/61 - 132/66)  RR: 18 (05 Nov 2017 10:44) (18 - 18)  SpO2: 95% (05 Nov 2017 05:09) (95% - 96%)    PHYSICAL EXAM:    GENERAL: NAD  NERVOUS SYSTEM:  Alert & Oriented X3, Good concentration; Motor Strength 5/5 B/L upper and lower extremities; DTRs 2+ intact and symmetric  CHEST/LUNG: positive air entry   HEART: s1/s2 audible   ABDOMEN: Soft, Nontender, Nondistended; Bowel sounds present  EXTREMITIES no edema     Stable for discharge  Time spent 40 minutes

## 2017-11-05 NOTE — PROGRESS NOTE ADULT - SUBJECTIVE AND OBJECTIVE BOX
NEPHROLOGY INTERVAL HPI/OVERNIGHT EVENTS:    feels better   No new events   Cardio follow up noted     MEDICATIONS  (STANDING):  aspirin enteric coated 81 milliGRAM(s) Oral daily  atorvastatin 40 milliGRAM(s) Oral at bedtime  carvedilol 6.25 milliGRAM(s) Oral every 12 hours  clopidogrel Tablet 75 milliGRAM(s) Oral daily  furosemide   Injectable 40 milliGRAM(s) IV Push daily  heparin  Injectable 5000 Unit(s) SubCutaneous every 8 hours  pantoprazole    Tablet 40 milliGRAM(s) Oral before breakfast  tamsulosin 0.4 milliGRAM(s) Oral at bedtime    MEDICATIONS  (PRN):  morphine  - Injectable 2.5 milliGRAM(s) IV Push every 4 hours PRN Severe Pain (7 - 10)  ondansetron Injectable 4 milliGRAM(s) IV Push every 6 hours PRN Nausea and/or Vomiting      Allergies    enalapril (Hives)    Intolerances          Vital Signs Last 24 Hrs  T(C): 37.1 (2017 10:44), Max: 37.7 (2017 17:26)  T(F): 98.7 (2017 10:44), Max: 99.9 (2017 17:26)  HR: 90 (2017 10:44) (60 - 90)  BP: 103/61 (2017 10:44) (103/61 - 132/66)  BP(mean): --  RR: 18 (2017 10:44) (18 - 18)  SpO2: 95% (2017 05:09) (95% - 96%)  Daily     Daily   I&O's Detail    2017 08:  -  2017 07:00  --------------------------------------------------------  IN:    Oral Fluid: 120 mL  Total IN: 120 mL    OUT:    Voided: 2600 mL  Total OUT: 2600 mL    Total NET: -2480 mL        I&O's Summary    2017 08:01  -  2017 07:00  --------------------------------------------------------  IN: 120 mL / OUT: 2600 mL / NET: -2480 mL        PHYSICAL EXAM:  NECK: Supple, No JVD/Bruit, Normal thyroid  NERVOUS SYSTEM:  A/O x3,   CHEST No rales, rhonchi,  or rubs  HEART: Regular rate and rhythm; No murmurs, rubs, or gallops  ABDOMEN: Soft, NT/ND BS+  EXTREMITIES:  less edema       LABS:                        14.6   8.7   )-----------( 217      ( 2017 07:15 )             41.4         139  |  100  |  64.0<H>  ----------------------------<  117<H>  4.3   |  24.0  |  1.91<H>    Ca    9.1      2017 07:15  Mg     2.2             Urinalysis Basic - ( 2017 02:24 )    Color: Yellow / Appearance: Clear / S.015 / pH: x  Gluc: x / Ketone: Negative  / Bili: Negative / Urobili: 1 mg/dL   Blood: x / Protein: 15 mg/dL / Nitrite: Negative   Leuk Esterase: Negative / RBC: 0-2 /HPF / WBC Negative   Sq Epi: x / Non Sq Epi: Occasional / Bacteria: x      Magnesium, Serum: 2.2 mg/dL ( @ 07:15)          RADIOLOGY & ADDITIONAL TESTS:

## 2017-11-05 NOTE — DISCHARGE NOTE ADULT - PROVIDER TOKENS
TOKEN:'[id number]',TOKEN:'[id number]',FREE:[LAST:[PCP- Zotto],PHONE:[(   )    -],FAX:[(   )    -]]

## 2017-11-05 NOTE — DISCHARGE NOTE ADULT - PLAN OF CARE
. c/w lasix, coreg, f/u cardiology  F/U BMP in 1 week, if creatinine is back to basleine, consider restarting losartan improving f/u BMP and renal  F/U BMP in 1 week, if creatinine is back to baseline, consider restarting losartan c/w home medication, f/u cardiology c/w flomax hold amlodipine, c/w coreg, monitor BP c/w PPI

## 2017-11-05 NOTE — DISCHARGE NOTE ADULT - CARE PROVIDER_API CALL
Cole Molina (DO), Medicine  [address] [place] [address]  Phone: [phone]  Fax: [phone]    Kaitlin Antonio), Cardiovascular Disease; Internal Medicine  [address] [place] [id number]  Phone: [phone]  Fax: [phone]    PCP- Matthew,   Phone: (   )    -  Fax: (   )    -

## 2017-11-05 NOTE — DISCHARGE NOTE ADULT - MEDICATION SUMMARY - MEDICATIONS TO STOP TAKING
I will STOP taking the medications listed below when I get home from the hospital:    losartan 50 mg oral tablet  -- 1 tab(s) by mouth once a day    amLODIPine 10 mg oral tablet  -- 1 tab(s) by mouth once a day

## 2017-11-05 NOTE — DISCHARGE NOTE ADULT - CARE PLAN
Principal Discharge DX:	Acute on chronic systolic congestive heart failure  Goal:	.  Instructions for follow-up, activity and diet:	c/w lasix, coreg, f/u cardiology  F/U BMP in 1 week, if creatinine is back to basleine, consider restarting losartan  Secondary Diagnosis:	ATN (acute tubular necrosis)  Goal:	improving  Instructions for follow-up, activity and diet:	f/u BMP and renal  F/U BMP in 1 week, if creatinine is back to baseline, consider restarting losartan  Secondary Diagnosis:	Coronary artery disease involving native heart, angina presence unspecified, unspecified vessel or lesion type  Instructions for follow-up, activity and diet:	c/w home medication, f/u cardiology  Secondary Diagnosis:	BPH (benign prostatic hyperplasia)  Instructions for follow-up, activity and diet:	c/w flomax  Secondary Diagnosis:	NSTEMI (non-ST elevated myocardial infarction)  Secondary Diagnosis:	Essential hypertension  Instructions for follow-up, activity and diet:	hold amlodipine, c/w coreg, monitor BP  Secondary Diagnosis:	Moreno esophagus  Instructions for follow-up, activity and diet:	c/w PPI

## 2017-11-05 NOTE — DISCHARGE NOTE ADULT - PATIENT PORTAL LINK FT
“You can access the [facility] Patient Portal, offered by [facility], by registering with the following website: [email or web address]”

## 2017-11-05 NOTE — DISCHARGE NOTE ADULT - MEDICATION SUMMARY - MEDICATIONS TO TAKE
I will START or STAY ON the medications listed below when I get home from the hospital:    aspirin 81 mg oral tablet  -- 1 tab(s) by mouth once a day  -- Indication: For Coronary artery disease involving native heart, angina presence unspecified, unspecified vessel or lesion type    tamsulosin 0.4 mg oral capsule  -- 1 cap(s) by mouth once a day (at bedtime)  -- Indication: For BPH (benign prostatic hyperplasia)    lovastatin 40 mg oral tablet  -- 1 tab(s) by mouth once a day  -- Indication: For Coronary artery disease involving native heart, angina presence unspecified, unspecified vessel or lesion type    Plavix 75 mg oral tablet  -- 1 tab(s) by mouth once a day  -- Indication: For Coronary artery disease involving native heart, angina presence unspecified, unspecified vessel or lesion type    carvedilol 6.25 mg oral tablet  -- 1 tab(s) by mouth every 12 hours  -- Indication: For Coronary artery disease involving native heart, angina presence unspecified, unspecified vessel or lesion type    furosemide 40 mg oral tablet  -- 1 tab(s) by mouth once a day   -- Avoid prolonged or excessive exposure to direct and/or artificial sunlight while taking this medication.  It is very important that you take or use this exactly as directed.  Do not skip doses or discontinue unless directed by your doctor.  It may be advisable to drink a full glass orange juice or eat a banana daily while taking this medication.    -- Indication: For Chf    Fish Oil 1200 mg oral capsule  -- Indication: For Home medication    pantoprazole 40 mg oral delayed release tablet  -- Indication: For Home medication    Vitamin D3  -- Indication: For Home medication

## 2017-11-05 NOTE — PROGRESS NOTE ADULT - PROVIDER SPECIALTY LIST ADULT
Cardiology
Critical Care
Hospitalist
Nephrology
Neurology
Cardiology
Hospitalist
Hospitalist

## 2017-12-05 ENCOUNTER — TRANSCRIPTION ENCOUNTER (OUTPATIENT)
Age: 77
End: 2017-12-05

## 2018-05-29 ENCOUNTER — EMERGENCY (EMERGENCY)
Facility: HOSPITAL | Age: 78
LOS: 1 days | Discharge: HOME | End: 2018-05-29
Attending: EMERGENCY MEDICINE
Payer: MEDICARE

## 2018-05-29 VITALS
DIASTOLIC BLOOD PRESSURE: 77 MMHG | TEMPERATURE: 99 F | RESPIRATION RATE: 18 BRPM | WEIGHT: 199.96 LBS | HEIGHT: 71 IN | SYSTOLIC BLOOD PRESSURE: 107 MMHG | HEART RATE: 79 BPM | OXYGEN SATURATION: 98 %

## 2018-05-29 VITALS
DIASTOLIC BLOOD PRESSURE: 78 MMHG | RESPIRATION RATE: 18 BRPM | SYSTOLIC BLOOD PRESSURE: 110 MMHG | OXYGEN SATURATION: 98 % | HEART RATE: 64 BPM

## 2018-05-29 DIAGNOSIS — Z98.890 OTHER SPECIFIED POSTPROCEDURAL STATES: Chronic | ICD-10-CM

## 2018-05-29 DIAGNOSIS — Z95.5 PRESENCE OF CORONARY ANGIOPLASTY IMPLANT AND GRAFT: Chronic | ICD-10-CM

## 2018-05-29 DIAGNOSIS — Z90.49 ACQUIRED ABSENCE OF OTHER SPECIFIED PARTS OF DIGESTIVE TRACT: Chronic | ICD-10-CM

## 2018-05-29 DIAGNOSIS — Z95.0 PRESENCE OF CARDIAC PACEMAKER: Chronic | ICD-10-CM

## 2018-05-29 DIAGNOSIS — Z92.89 PERSONAL HISTORY OF OTHER MEDICAL TREATMENT: Chronic | ICD-10-CM

## 2018-05-29 PROBLEM — I25.10 ATHEROSCLEROTIC HEART DISEASE OF NATIVE CORONARY ARTERY WITHOUT ANGINA PECTORIS: Chronic | Status: ACTIVE | Noted: 2017-10-30

## 2018-05-29 PROBLEM — I49.5 SICK SINUS SYNDROME: Chronic | Status: ACTIVE | Noted: 2017-10-30

## 2018-05-29 PROBLEM — E78.00 PURE HYPERCHOLESTEROLEMIA, UNSPECIFIED: Chronic | Status: ACTIVE | Noted: 2017-10-30

## 2018-05-29 LAB
ALBUMIN SERPL ELPH-MCNC: 3.8 G/DL — SIGNIFICANT CHANGE UP (ref 3.3–5.2)
ALP SERPL-CCNC: 64 U/L — SIGNIFICANT CHANGE UP (ref 40–120)
ALT FLD-CCNC: 16 U/L — SIGNIFICANT CHANGE UP
ANION GAP SERPL CALC-SCNC: 17 MMOL/L — SIGNIFICANT CHANGE UP (ref 5–17)
AST SERPL-CCNC: 22 U/L — SIGNIFICANT CHANGE UP
BASOPHILS # BLD AUTO: 0 K/UL — SIGNIFICANT CHANGE UP (ref 0–0.2)
BASOPHILS NFR BLD AUTO: 0 % — SIGNIFICANT CHANGE UP (ref 0–2)
BILIRUB SERPL-MCNC: 1.4 MG/DL — SIGNIFICANT CHANGE UP (ref 0.4–2)
BUN SERPL-MCNC: 31 MG/DL — HIGH (ref 8–20)
CALCIUM SERPL-MCNC: 9.6 MG/DL — SIGNIFICANT CHANGE UP (ref 8.6–10.2)
CHLORIDE SERPL-SCNC: 98 MMOL/L — SIGNIFICANT CHANGE UP (ref 98–107)
CO2 SERPL-SCNC: 21 MMOL/L — LOW (ref 22–29)
CREAT SERPL-MCNC: 1.35 MG/DL — HIGH (ref 0.5–1.3)
CRP SERPL-MCNC: 4.1 MG/DL — HIGH (ref 0–0.4)
EOSINOPHIL # BLD AUTO: 0.3 K/UL — SIGNIFICANT CHANGE UP (ref 0–0.5)
EOSINOPHIL NFR BLD AUTO: 6 % — SIGNIFICANT CHANGE UP (ref 0–6)
ERYTHROCYTE [SEDIMENTATION RATE] IN BLOOD: 20 MM/HR — SIGNIFICANT CHANGE UP (ref 0–20)
GLUCOSE SERPL-MCNC: 100 MG/DL — SIGNIFICANT CHANGE UP (ref 70–115)
HCT VFR BLD CALC: 47.6 % — SIGNIFICANT CHANGE UP (ref 42–52)
HGB BLD-MCNC: 16.8 G/DL — SIGNIFICANT CHANGE UP (ref 14–18)
LYMPHOCYTES # BLD AUTO: 1.1 K/UL — SIGNIFICANT CHANGE UP (ref 1–4.8)
LYMPHOCYTES # BLD AUTO: 10 % — LOW (ref 20–55)
MCHC RBC-ENTMCNC: 34.3 PG — HIGH (ref 27–31)
MCHC RBC-ENTMCNC: 35.3 G/DL — SIGNIFICANT CHANGE UP (ref 32–36)
MCV RBC AUTO: 97.1 FL — HIGH (ref 80–94)
MONOCYTES # BLD AUTO: 1.4 K/UL — HIGH (ref 0–0.8)
MONOCYTES NFR BLD AUTO: 8 % — SIGNIFICANT CHANGE UP (ref 3–10)
NEUTROPHILS # BLD AUTO: 5.3 K/UL — SIGNIFICANT CHANGE UP (ref 1.8–8)
NEUTROPHILS NFR BLD AUTO: 69 % — SIGNIFICANT CHANGE UP (ref 37–73)
PLAT MORPH BLD: NORMAL — SIGNIFICANT CHANGE UP
PLATELET # BLD AUTO: 172 K/UL — SIGNIFICANT CHANGE UP (ref 150–400)
POTASSIUM SERPL-MCNC: 4 MMOL/L — SIGNIFICANT CHANGE UP (ref 3.5–5.3)
POTASSIUM SERPL-SCNC: 4 MMOL/L — SIGNIFICANT CHANGE UP (ref 3.5–5.3)
PROT SERPL-MCNC: 7.2 G/DL — SIGNIFICANT CHANGE UP (ref 6.6–8.7)
RBC # BLD: 4.9 M/UL — SIGNIFICANT CHANGE UP (ref 4.6–6.2)
RBC # FLD: 12.9 % — SIGNIFICANT CHANGE UP (ref 11–15.6)
RBC BLD AUTO: SIGNIFICANT CHANGE UP
SODIUM SERPL-SCNC: 136 MMOL/L — SIGNIFICANT CHANGE UP (ref 135–145)
VARIANT LYMPHS # BLD: 7 % — HIGH (ref 0–6)
WBC # BLD: 8.1 K/UL — SIGNIFICANT CHANGE UP (ref 4.8–10.8)
WBC # FLD AUTO: 8.1 K/UL — SIGNIFICANT CHANGE UP (ref 4.8–10.8)

## 2018-05-29 PROCEDURE — 80053 COMPREHEN METABOLIC PANEL: CPT

## 2018-05-29 PROCEDURE — 85652 RBC SED RATE AUTOMATED: CPT

## 2018-05-29 PROCEDURE — 86140 C-REACTIVE PROTEIN: CPT

## 2018-05-29 PROCEDURE — 73610 X-RAY EXAM OF ANKLE: CPT | Mod: 26,RT

## 2018-05-29 PROCEDURE — 36415 COLL VENOUS BLD VENIPUNCTURE: CPT

## 2018-05-29 PROCEDURE — 99284 EMERGENCY DEPT VISIT MOD MDM: CPT

## 2018-05-29 PROCEDURE — 73630 X-RAY EXAM OF FOOT: CPT | Mod: 26,RT

## 2018-05-29 PROCEDURE — 85027 COMPLETE CBC AUTOMATED: CPT

## 2018-05-29 PROCEDURE — 73610 X-RAY EXAM OF ANKLE: CPT

## 2018-05-29 PROCEDURE — 73630 X-RAY EXAM OF FOOT: CPT

## 2018-05-29 PROCEDURE — 87040 BLOOD CULTURE FOR BACTERIA: CPT

## 2018-05-29 RX ORDER — CEPHALEXIN 500 MG
1 CAPSULE ORAL
Qty: 20 | Refills: 0 | OUTPATIENT
Start: 2018-05-29 | End: 2018-06-02

## 2018-05-29 RX ORDER — OXYCODONE AND ACETAMINOPHEN 5; 325 MG/1; MG/1
1 TABLET ORAL ONCE
Qty: 0 | Refills: 0 | Status: DISCONTINUED | OUTPATIENT
Start: 2018-05-29 | End: 2018-05-29

## 2018-05-29 RX ADMIN — OXYCODONE AND ACETAMINOPHEN 1 TABLET(S): 5; 325 TABLET ORAL at 14:49

## 2018-05-29 NOTE — ED PROVIDER NOTE - CARE PLAN
Principal Discharge DX:	Heel spur, right  Assessment and plan of treatment:	Continue with medication as discussed   F/U with Podiatrist  Secondary Diagnosis:	Cellulitis of right lower extremity

## 2018-05-29 NOTE — ED PROVIDER NOTE - OBJECTIVE STATEMENT
78 yr old M presented to Ed with right ankle and heel  pain x 2 days. Pt explained that he noticed pain to his heel x 2 days ago and it has gotten worst. Pt denies  any fever or chills at this time. Pt admits to a hx of MI , CAD and stent placement. Pt also admits to hx HTN , hypercholesterolemia, Basal cell carcinoma and jaw cancer. Pt admits to pain with ambulation.

## 2018-05-29 NOTE — ED PROVIDER NOTE - PHYSICAL EXAMINATION
Right lower extremity + Slight swelling to ankle and heel . Pt unable to bear weight due to pain  Normal PT and DP pulses present.

## 2018-05-29 NOTE — ED PROVIDER NOTE - PROGRESS NOTE DETAILS
Pt amble  to ambulate after treatment with Percocet. Pt will F/U with Podiatrist. Pt understand to return to ED immediately if increases swelling or redness occurs.

## 2018-05-29 NOTE — ED PROVIDER NOTE - MEDICAL DECISION MAKING DETAILS
78 yr old M presented to Ed with right ankle and heel  pain x 2 days. Pt explained that he noticed pain to his heel x 2 days ago and it has gotten worst. Pt denies  any fever or chills at this time. Pt admits to a hx of MI , CAD and stent placement. Pt labs discussed with patient and Pt D/C with Rx keflex and F/U podiatrist.

## 2018-05-29 NOTE — ED PROVIDER NOTE - ATTENDING CONTRIBUTION TO CARE
Dr. Zamudio : I have personally seen and examined this patient at the bedside. I have fully participated in the care of this patient. I have reviewed all pertinent clinical information, including history, physical exam, plan and the Resident's note and agree except as noted.     79yo M hx of MI cad stents htn hld jaw cancer sp skin graft sx 10 days ago.  pw right heel pain x 2 days. notes that it hurts to step onto his heel + pain with every step. noticed mild redness to the heel as well.   Denies f/c/n/v/cp/sob/palpitations/cough/abd.pain/d/c/dysuria/hematuria. no sick contacts/recent travel.    PE:  head; atraumatic normocephalic  eyes: perrla  Heart: rrr s1s2  lungs: ctab  abd: soft, nt nd + bs no rebound/guarding no cva ttp  le: no swelling no calf ttp; no swelling to ankle or foot + rendess and ttp to heel; mild redness/warmth to back back of heel/acheles tendon area; FROM, dorsiflexion and plantaflexion  back: no midline cervical/thoracic/lumbar ttp    -->xray shows heel spur; possibly this is what causing the pain; however area mildly erythematous possible early cellulitis will give keflex, labs, analgesia--reassess; unlikely septic joint as no redness swelling to ankle from--reassess ; if pt feels better labs wnl dc with ortho follow up and abx with return precautions

## 2018-06-03 LAB
CULTURE RESULTS: SIGNIFICANT CHANGE UP
CULTURE RESULTS: SIGNIFICANT CHANGE UP
SPECIMEN SOURCE: SIGNIFICANT CHANGE UP
SPECIMEN SOURCE: SIGNIFICANT CHANGE UP

## 2018-12-29 ENCOUNTER — EMERGENCY (EMERGENCY)
Facility: HOSPITAL | Age: 78
LOS: 1 days | Discharge: HOME | End: 2018-12-29
Attending: EMERGENCY MEDICINE
Payer: MEDICARE

## 2018-12-29 VITALS
OXYGEN SATURATION: 96 % | SYSTOLIC BLOOD PRESSURE: 124 MMHG | TEMPERATURE: 99 F | WEIGHT: 184.97 LBS | HEART RATE: 59 BPM | RESPIRATION RATE: 18 BRPM | HEIGHT: 70 IN | DIASTOLIC BLOOD PRESSURE: 76 MMHG

## 2018-12-29 DIAGNOSIS — Z95.5 PRESENCE OF CORONARY ANGIOPLASTY IMPLANT AND GRAFT: Chronic | ICD-10-CM

## 2018-12-29 DIAGNOSIS — Z90.49 ACQUIRED ABSENCE OF OTHER SPECIFIED PARTS OF DIGESTIVE TRACT: Chronic | ICD-10-CM

## 2018-12-29 DIAGNOSIS — Z98.890 OTHER SPECIFIED POSTPROCEDURAL STATES: Chronic | ICD-10-CM

## 2018-12-29 DIAGNOSIS — Z95.0 PRESENCE OF CARDIAC PACEMAKER: Chronic | ICD-10-CM

## 2018-12-29 DIAGNOSIS — Z92.89 PERSONAL HISTORY OF OTHER MEDICAL TREATMENT: Chronic | ICD-10-CM

## 2018-12-29 PROCEDURE — 99283 EMERGENCY DEPT VISIT LOW MDM: CPT | Mod: 25

## 2018-12-29 PROCEDURE — 99283 EMERGENCY DEPT VISIT LOW MDM: CPT

## 2018-12-29 RX ORDER — LACTOBACILLUS ACIDOPHILUS 100MM CELL
2 CAPSULE ORAL
Qty: 60 | Refills: 0 | OUTPATIENT
Start: 2018-12-29 | End: 2019-01-27

## 2018-12-29 RX ORDER — CEPHALEXIN 500 MG
500 CAPSULE ORAL ONCE
Qty: 0 | Refills: 0 | Status: DISCONTINUED | OUTPATIENT
Start: 2018-12-29 | End: 2019-01-03

## 2018-12-29 RX ORDER — CEPHALEXIN 500 MG
1 CAPSULE ORAL
Qty: 40 | Refills: 0 | OUTPATIENT
Start: 2018-12-29 | End: 2019-01-07

## 2018-12-29 NOTE — ED ADULT NURSE NOTE - CHPI ED NUR SYMPTOMS NEG
no numbness/no deformity/no fever/no bruising/no tingling/no abrasion/no back pain/no weakness/no stiffness

## 2018-12-29 NOTE — ED PROVIDER NOTE - OBJECTIVE STATEMENT
79 y/o M c/o pain of skin on left ankle x 3 days.  Patient denies trauma.  he states that it is painful to slight touch.  Denies fever, difficulty breathing, chest pain, leg swelling or any other complaints.

## 2018-12-29 NOTE — ED PROVIDER NOTE - ATTENDING CONTRIBUTION TO CARE
I, Shanika Solorzano, performed a face to face bedside interview with this patient regarding history of present illness, review of symptoms and relevant past medical, social and family history.  I completed an independent physical examination. Medical decision making, follow-up on ordered tests (ie labs, radiologic studies) and re-evaluation of the patient's status has been communicated to the ACP.  Disposition of the patient will be based on test outcome and response to ED interventions.     Pt with left ankle pain x1 day, worse with ambulation. +swelling with redness. no fever/chills. no h/o DM not on immunosuppressive agents. no trauma. able to walk but with cane due to pain.     +erythema/warmth and swelling left medial ankle/foot, no bony ttp left ankle/foot, no crepitus, no bullae/petechiase   no calf swelling/ttp     Pt with cellulitis, no risk factors for resistant organisms- will give keflex and close outpt Follow up

## 2018-12-29 NOTE — ED ADULT NURSE NOTE - OBJECTIVE STATEMENT
patient with left ankle pain and redness that goes to heal, denies injury started yesterday able to walk with discomfort

## 2018-12-29 NOTE — ED ADULT TRIAGE NOTE - CHIEF COMPLAINT QUOTE
foot pain x 3 days in lft ankle woke me uo from sleep it starts in lft abkle I feel it inside denies injuries no redness good cap refill

## 2019-08-29 ENCOUNTER — EMERGENCY (EMERGENCY)
Facility: HOSPITAL | Age: 79
LOS: 1 days | Discharge: HOME | End: 2019-08-29
Attending: EMERGENCY MEDICINE
Payer: MEDICARE

## 2019-08-29 VITALS
DIASTOLIC BLOOD PRESSURE: 77 MMHG | HEART RATE: 54 BPM | TEMPERATURE: 98 F | RESPIRATION RATE: 20 BRPM | OXYGEN SATURATION: 96 % | SYSTOLIC BLOOD PRESSURE: 125 MMHG

## 2019-08-29 VITALS — HEIGHT: 70 IN | WEIGHT: 184.97 LBS

## 2019-08-29 DIAGNOSIS — Z95.0 PRESENCE OF CARDIAC PACEMAKER: Chronic | ICD-10-CM

## 2019-08-29 DIAGNOSIS — Z90.49 ACQUIRED ABSENCE OF OTHER SPECIFIED PARTS OF DIGESTIVE TRACT: Chronic | ICD-10-CM

## 2019-08-29 DIAGNOSIS — Z98.890 OTHER SPECIFIED POSTPROCEDURAL STATES: Chronic | ICD-10-CM

## 2019-08-29 DIAGNOSIS — Z95.5 PRESENCE OF CORONARY ANGIOPLASTY IMPLANT AND GRAFT: Chronic | ICD-10-CM

## 2019-08-29 DIAGNOSIS — Z92.89 PERSONAL HISTORY OF OTHER MEDICAL TREATMENT: Chronic | ICD-10-CM

## 2019-08-29 LAB
ALBUMIN SERPL ELPH-MCNC: 4.2 G/DL — SIGNIFICANT CHANGE UP (ref 3.3–5.2)
ALP SERPL-CCNC: 65 U/L — SIGNIFICANT CHANGE UP (ref 40–120)
ALT FLD-CCNC: 17 U/L — SIGNIFICANT CHANGE UP
ANION GAP SERPL CALC-SCNC: 11 MMOL/L — SIGNIFICANT CHANGE UP (ref 5–17)
APTT BLD: 32.4 SEC — SIGNIFICANT CHANGE UP (ref 27.5–36.3)
AST SERPL-CCNC: 23 U/L — SIGNIFICANT CHANGE UP
BILIRUB SERPL-MCNC: 0.9 MG/DL — SIGNIFICANT CHANGE UP (ref 0.4–2)
BUN SERPL-MCNC: 26 MG/DL — HIGH (ref 8–20)
CALCIUM SERPL-MCNC: 9.1 MG/DL — SIGNIFICANT CHANGE UP (ref 8.6–10.2)
CHLORIDE SERPL-SCNC: 104 MMOL/L — SIGNIFICANT CHANGE UP (ref 98–107)
CO2 SERPL-SCNC: 25 MMOL/L — SIGNIFICANT CHANGE UP (ref 22–29)
CREAT SERPL-MCNC: 1.61 MG/DL — HIGH (ref 0.5–1.3)
D DIMER BLD IA.RAPID-MCNC: <150 NG/ML DDU — SIGNIFICANT CHANGE UP
GLUCOSE SERPL-MCNC: 92 MG/DL — SIGNIFICANT CHANGE UP (ref 70–115)
HCT VFR BLD CALC: 46.6 % — SIGNIFICANT CHANGE UP (ref 39–50)
HGB BLD-MCNC: 15.8 G/DL — SIGNIFICANT CHANGE UP (ref 13–17)
INR BLD: 1.06 RATIO — SIGNIFICANT CHANGE UP (ref 0.88–1.16)
MCHC RBC-ENTMCNC: 33.9 GM/DL — SIGNIFICANT CHANGE UP (ref 32–36)
MCHC RBC-ENTMCNC: 34.6 PG — HIGH (ref 27–34)
MCV RBC AUTO: 102 FL — HIGH (ref 80–100)
PLATELET # BLD AUTO: 155 K/UL — SIGNIFICANT CHANGE UP (ref 150–400)
POTASSIUM SERPL-MCNC: 4.4 MMOL/L — SIGNIFICANT CHANGE UP (ref 3.5–5.3)
POTASSIUM SERPL-SCNC: 4.4 MMOL/L — SIGNIFICANT CHANGE UP (ref 3.5–5.3)
PROT SERPL-MCNC: 6.7 G/DL — SIGNIFICANT CHANGE UP (ref 6.6–8.7)
PROTHROM AB SERPL-ACNC: 12.2 SEC — SIGNIFICANT CHANGE UP (ref 10–12.9)
RBC # BLD: 4.57 M/UL — SIGNIFICANT CHANGE UP (ref 4.2–5.8)
RBC # FLD: 13.2 % — SIGNIFICANT CHANGE UP (ref 10.3–14.5)
SODIUM SERPL-SCNC: 140 MMOL/L — SIGNIFICANT CHANGE UP (ref 135–145)
TROPONIN T SERPL-MCNC: <0.01 NG/ML — SIGNIFICANT CHANGE UP (ref 0–0.06)
TROPONIN T SERPL-MCNC: <0.01 NG/ML — SIGNIFICANT CHANGE UP (ref 0–0.06)
WBC # BLD: 6.9 K/UL — SIGNIFICANT CHANGE UP (ref 3.8–10.5)
WBC # FLD AUTO: 6.9 K/UL — SIGNIFICANT CHANGE UP (ref 3.8–10.5)

## 2019-08-29 PROCEDURE — 93010 ELECTROCARDIOGRAM REPORT: CPT

## 2019-08-29 PROCEDURE — 71045 X-RAY EXAM CHEST 1 VIEW: CPT | Mod: 26

## 2019-08-29 PROCEDURE — 85379 FIBRIN DEGRADATION QUANT: CPT

## 2019-08-29 PROCEDURE — 85730 THROMBOPLASTIN TIME PARTIAL: CPT

## 2019-08-29 PROCEDURE — 84484 ASSAY OF TROPONIN QUANT: CPT

## 2019-08-29 PROCEDURE — 85027 COMPLETE CBC AUTOMATED: CPT

## 2019-08-29 PROCEDURE — 85610 PROTHROMBIN TIME: CPT

## 2019-08-29 PROCEDURE — 36415 COLL VENOUS BLD VENIPUNCTURE: CPT

## 2019-08-29 PROCEDURE — 99285 EMERGENCY DEPT VISIT HI MDM: CPT

## 2019-08-29 PROCEDURE — 99284 EMERGENCY DEPT VISIT MOD MDM: CPT | Mod: 25

## 2019-08-29 PROCEDURE — 93005 ELECTROCARDIOGRAM TRACING: CPT

## 2019-08-29 PROCEDURE — 80053 COMPREHEN METABOLIC PANEL: CPT

## 2019-08-29 PROCEDURE — 71045 X-RAY EXAM CHEST 1 VIEW: CPT

## 2019-08-29 RX ORDER — SODIUM CHLORIDE 9 MG/ML
3 INJECTION INTRAMUSCULAR; INTRAVENOUS; SUBCUTANEOUS ONCE
Refills: 0 | Status: COMPLETED | OUTPATIENT
Start: 2019-08-29 | End: 2019-08-29

## 2019-08-29 RX ORDER — RANOLAZINE 500 MG/1
1 TABLET, FILM COATED, EXTENDED RELEASE ORAL
Qty: 60 | Refills: 0
Start: 2019-08-29 | End: 2019-09-27

## 2019-08-29 RX ADMIN — SODIUM CHLORIDE 3 MILLILITER(S): 9 INJECTION INTRAMUSCULAR; INTRAVENOUS; SUBCUTANEOUS at 11:28

## 2019-08-29 NOTE — CONSULT NOTE ADULT - SUBJECTIVE AND OBJECTIVE BOX
[facility], [facility]                              [address] [address]                                                 PHONE: [phone]                                                 FAX: [phone]  ------------------------------------------------------------------------------------------------    79y Male with past medical history as under reported chest pain since 5:30 AM. Pt reports one spot on the left side of the chest where he has pain but no radiation. He does not report any associated shortness of breath. He otherwise remains active as per him without limitations.   At the time of evaluation, pt reports no chest pain for past 20 min.     PAST MEDICAL & SURGICAL HISTORY:  Sick sinus syndrome  Coronary artery disease involving native heart, angina presence unspecified, unspecified vessel or lesion type  High cholesterol  History of open heart surgery: x2  H/O transfusion: x2 5/2016  H/O heart artery stent: x3 [id number]  History of appendectomy: 1997  H/O cardiac pacemaker: 2016      Cardizem (Unknown)  enalapril (Hives)  Inderal (Unknown)      Review of Systems:  Positive for chest pain  Rest of the systems were reviewed and was negative.     Family history:   No pertinent family history in first degree relative of early CAD, sudden cardiac death or  congenital heart disease    Social History:  Former smoking   No alcohol  No other drug use    Vital Signs Last 24 Hrs  BP: 126/68  HR: 58    PHYSICAL EXAM:  Constitutional: Appears well developed, well nourished; alert and co-operative  HEENT:     Head: Normocephalic and atraumatic  Eyes: Conjunctiva normal, No scleral icterus  Neck: Supple, No JVD  Mouth/Throat: Mucous membranes are normal. Mucosa are not pale or dry.  Cardiovascular: regular S1, S2  Respiratory: Lungs clear to auscultation; no crepitations, no wheeze  Gastrointestinal:  Soft, Non-tender, + BS	  Musculoskeletal: Normal range of motion. No edema  Skin: Warm and dry. No cyanosis . No diaphoresis.  Neurologic: Alert oriented to time place and person. Normal strength and no tremor.  Psychiatric: Normal mood and affect, Speech is normal and behavior is normal.        LABS:                        15.8   6.90  )-----------( 155      ( 29 Aug 2019 11:49 )             46.6     PT/INR - ( 29 Aug 2019 11:49 )   PT: 12.2 sec;   INR: 1.06 ratio       PTT - ( 29 Aug 2019 11:49 )  PTT:32.4 sec    RADIOLOGY & ADDITIONAL STUDIES: (reviewed)    CARDIOLOGY TESTING:(reviewed)     ECG (Independent visualization): Paced rhythm    DOPPLER ECHOCARDIOGRAM:  Moderate mitral and mild tricuspid insufficiency. There is mild aortic insufficiency. The left atrium is dilated. There is abnormal septal motion secondary to the pacemaker with diffuse global hypokinesis and ejection fraction in the 30-35% range. The left atrium and left ventricle are mildly dilated.    Cardiac catheterization on 10/31/17 revealed patent stents in the proximal LAD and RCA and a patent LIMA to the diagonal branch. There was a 90% LAD stenosis in the distal 1/3 of the vessel and 100% occlusion of the OM1. The diagonal branch was also occluded. Medical treatment was recommended.    MEDICATIONS:(reviewed)  Home Medications:  Home Medications:  aspirin 81 mg oral tablet: 1 tab(s) orally once a day (30 Oct 2017 19:22)  Fish Oil 1200 mg oral capsule:  (30 Oct 2017 19:22)  lovastatin 40 mg oral tablet: 1 tab(s) orally once a day (30 Oct 2017 19:22)  pantoprazole 40 mg oral delayed release tablet:  (30 Oct 2017 19:22)  Plavix 75 mg oral tablet: 1 tab(s) orally once a day (30 Oct 2017 19:22)  Vitamin D3:  (30 Oct 2017 19:22)    MEDICATIONS  (STANDING):    MEDICATIONS  (PRN):    ASSESSMENT AND PLAN:    79 -year-old male status post MI in 1984 followed by coronary bypass surgery and multiple stents, the last of which was in 2009. Patient is status post a wireless permanent pacemaker. Cardiac catheterization revealed a patent LIMA to the diagonal branch and patent stents to the proximal LAD and RCA with total occlusion of the diagonal and obtuse marginal circumflex and a 90% lesion in the distal LAD treated medically. Doppler echocardiography reveals ejection fraction in the 30-35% range which is similar to the ejection fraction in 2017 with an EF in the 35-40% range.    1.	CAD: Pt reports no chest pain at this time. Continue ASA and Plavix  	Check cardiac enzymes X2.  	If pt has recurrent chest pain, will plan for admission and monitoring. If pt is chest pain remains chest pain free, can be discharged with plans for continued medical therapy. Add Ranexa 500 mg BID to Coreg.   2.	HTN: BP controlled  3.	Dyslipidemia: Continue statin  4.	CRI: Followed by Dr. Chavira with last creat: 1.55 as per pt  5.	CMP: Continue Coreg. Not on ACE-I/ARB due to CRI. No evidence of HF/volume overload at this time    This plan was discussed with the ER physician [facility]                              [address] [address]                                                 PHONE: [phone]                                                 FAX: [phone]  ------------------------------------------------------------------------------------------------    79y Male with past medical history as under reported chest pain since 5:30 AM. Pt reports one spot on the left side of the chest where he has pain but no radiation. He does not report any associated shortness of breath. He otherwise remains active as per him without limitations.   At the time of evaluation, pt reports no chest pain for past 20 min.     PAST MEDICAL & SURGICAL HISTORY:  Sick sinus syndrome  Coronary artery disease involving native heart, angina presence unspecified, unspecified vessel or lesion type  High cholesterol  History of open heart surgery: x2  H/O transfusion: x2 5/2016  H/O heart artery stent: x3 [phone]  History of appendectomy: 1997  H/O cardiac pacemaker: 2016      Cardizem (Unknown)  enalapril (Hives)  Inderal (Unknown)      Review of Systems:  Positive for chest pain  Rest of the systems were reviewed and was negative.     Family history:   No pertinent family history in first degree relative of early CAD, sudden cardiac death or  congenital heart disease    Social History:  Former smoking   No alcohol  No other drug use    Vital Signs Last 24 Hrs  BP: 126/68  HR: 58    PHYSICAL EXAM:  Constitutional: Appears well developed, well nourished; alert and co-operative  HEENT:     Head: Normocephalic and atraumatic  Eyes: Conjunctiva normal, No scleral icterus  Neck: Supple, No JVD  Mouth/Throat: Mucous membranes are normal. Mucosa are not pale or dry.  Cardiovascular: regular S1, S2  Respiratory: Lungs clear to auscultation; no crepitations, no wheeze  Gastrointestinal:  Soft, Non-tender, + BS	  Musculoskeletal: Normal range of motion. No edema  Skin: Warm and dry. No cyanosis . No diaphoresis.  Neurologic: Alert oriented to time place and person. Normal strength and no tremor.  Psychiatric: Normal mood and affect, Speech is normal and behavior is normal.        LABS:                        15.8   6.90  )-----------( 155      ( 29 Aug 2019 11:49 )             46.6     PT/INR - ( 29 Aug 2019 11:49 )   PT: 12.2 sec;   INR: 1.06 ratio       PTT - ( 29 Aug 2019 11:49 )  PTT:32.4 sec    RADIOLOGY & ADDITIONAL STUDIES: (reviewed)    CARDIOLOGY TESTING:(reviewed)     ECG (Independent visualization): Paced rhythm    DOPPLER ECHOCARDIOGRAM:  Moderate mitral and mild tricuspid insufficiency. There is mild aortic insufficiency. The left atrium is dilated. There is abnormal septal motion secondary to the pacemaker with diffuse global hypokinesis and ejection fraction in the 30-35% range. The left atrium and left ventricle are mildly dilated.    Cardiac catheterization on 10/31/17 revealed patent stents in the proximal LAD and RCA and a patent LIMA to the diagonal branch. There was a 90% LAD stenosis in the distal 1/3 of the vessel and 100% occlusion of the OM1. The diagonal branch was also occluded. Medical treatment was recommended.    MEDICATIONS:(reviewed)  Home Medications:  Home Medications:  aspirin 81 mg oral tablet: 1 tab(s) orally once a day (30 Oct 2017 19:22)  Fish Oil 1200 mg oral capsule:  (30 Oct 2017 19:22)  lovastatin 40 mg oral tablet: 1 tab(s) orally once a day (30 Oct 2017 19:22)  pantoprazole 40 mg oral delayed release tablet:  (30 Oct 2017 19:22)  Plavix 75 mg oral tablet: 1 tab(s) orally once a day (30 Oct 2017 19:22)  Vitamin D3:  (30 Oct 2017 19:22)    MEDICATIONS  (STANDING):    MEDICATIONS  (PRN):    ASSESSMENT AND PLAN:    79 -year-old male status post MI in 1984 followed by coronary bypass surgery and multiple stents, the last of which was in 2009. Patient is status post a wireless permanent pacemaker. Cardiac catheterization revealed a patent LIMA to the diagonal branch and patent stents to the proximal LAD and RCA with total occlusion of the diagonal and obtuse marginal circumflex and a 90% lesion in the distal LAD treated medically. Doppler echocardiography reveals ejection fraction in the 30-35% range which is similar to the ejection fraction in 2017 with an EF in the 35-40% range.    1.	CAD: Pt reports no chest pain at this time. Check cardiac enzymes X2.  	If pt has recurrent chest pain, will plan for admission and monitoring. If pt remains chest pain free, can be discharged with plans for continued medical therapy. Add Ranexa 500 mg BID to Coreg. Continue ASA and Plavix  2.	HTN: BP controlled  3.	Dyslipidemia: Continue statin  4.	CRI: Followed by Dr. Chavira with last creat: 1.55 as per pt  5.	CMP: Continue Coreg. Not on ACE-I/ARB due to CRI. No evidence of HF/volume overload at this time    This plan was discussed with the ER physician

## 2019-08-29 NOTE — ED ADULT NURSE NOTE - OBJECTIVE STATEMENT
Assumed pt care at 1110. Pt a&ox4 c/o intermittent chest pain that started this AM and lasts about 10seconds.  Pt states it feels like bone pain in his chest.  No acute s/s of respiratory distress noted or reported at this time, will continue to monitor.

## 2019-08-29 NOTE — ED PROVIDER NOTE - PATIENT PORTAL LINK FT
You can access the FollowMyHealth Patient Portal offered by [facility] by registering at the following website: [email or web address]. By joining [facility]’s FollowMyHealth portal, you will also be able to view your health information using other applications (apps) compatible with our system.

## 2019-08-29 NOTE — ED ADULT TRIAGE NOTE - CHIEF COMPLAINT QUOTE
patient comes to ed from home for intermittent chest pain for the last 2 days. patient has extensive cardiac hx ppm, 2 bypass, 3 stents. patient took 2 full strength aspirin and 2 sl nitro prior to arrival. pain is midsternal; non radiating.

## 2019-08-29 NOTE — ED PROVIDER NOTE - OBJECTIVE STATEMENT
80 yo male with chest pain  sudden onset at rest. Was awake before it started early this am. Left side no radiation or associated sob n diaphoresis  had pain just before interview but not now  ekg paced and second ekg unchanged no ischemia  med hx Coronary artery disease involving native heart, angina presence unspecified, unspecified vessel or lesion type    High cholesterol    Sick sinus syndrome

## 2019-08-29 NOTE — ED ADULT NURSE NOTE - PSH
H/O cardiac pacemaker  2016  H/O heart artery stent  x3 [phone]  H/O transfusion  x2 5/2016  History of appendectomy  1997  History of open heart surgery  x2

## 2019-08-29 NOTE — ED PROVIDER NOTE - PSH
H/O cardiac pacemaker  2016  H/O heart artery stent  x3 [id number]  H/O transfusion  x2 5/2016  History of appendectomy  1997  History of open heart surgery  x2

## 2019-08-29 NOTE — ED PROVIDER NOTE - CLINICAL SUMMARY MEDICAL DECISION MAKING FREE TEXT BOX
pt with cardiac hx presents with cp.  pain now gone pe normal  will check labs and trop and cardio consult  Cardio wants two trops if neg and no pain can dc pt with cardiac hx presents with cp.  pain now gone pe normal  will check labs and trop and cardio consult  Cardio wants two trops if neg and no pain can dc  trop x 2 neg

## 2020-07-02 ENCOUNTER — APPOINTMENT (OUTPATIENT)
Dept: ULTRASOUND IMAGING | Facility: CLINIC | Age: 80
End: 2020-07-02
Payer: MEDICARE

## 2020-07-02 ENCOUNTER — OUTPATIENT (OUTPATIENT)
Dept: OUTPATIENT SERVICES | Facility: HOSPITAL | Age: 80
LOS: 1 days | End: 2020-07-02

## 2020-07-02 DIAGNOSIS — Z95.5 PRESENCE OF CORONARY ANGIOPLASTY IMPLANT AND GRAFT: Chronic | ICD-10-CM

## 2020-07-02 DIAGNOSIS — Z00.8 ENCOUNTER FOR OTHER GENERAL EXAMINATION: ICD-10-CM

## 2020-07-02 DIAGNOSIS — Z90.49 ACQUIRED ABSENCE OF OTHER SPECIFIED PARTS OF DIGESTIVE TRACT: Chronic | ICD-10-CM

## 2020-07-02 DIAGNOSIS — Z92.89 PERSONAL HISTORY OF OTHER MEDICAL TREATMENT: Chronic | ICD-10-CM

## 2020-07-02 DIAGNOSIS — Z98.890 OTHER SPECIFIED POSTPROCEDURAL STATES: Chronic | ICD-10-CM

## 2020-07-02 DIAGNOSIS — Z95.0 PRESENCE OF CARDIAC PACEMAKER: Chronic | ICD-10-CM

## 2020-07-02 PROCEDURE — 76700 US EXAM ABDOM COMPLETE: CPT | Mod: 26

## 2020-11-13 NOTE — PROGRESS NOTE ADULT - PROBLEM SELECTOR PLAN 5
C/w protonix and pantoprazole
C/w statin
C/w statin
Normotensive  Holding amlodipine and losartan in setting of renal failure to allow for increased blood perfusion to kidneys
Normotensive  Holding amlodipine and losartan in setting of renal failure to allow for increased blood perfusion to kidneys  c/w coreg and lasix
No

## 2022-02-19 NOTE — H&P ADULT - NEGATIVE ENDOCRINE SYMPTOMS
Implemented All Universal Safety Interventions:  [place] to call system. Call bell, personal items and telephone within reach. Instruct patient to call for assistance. Room bathroom lighting operational. Non-slip footwear when patient is off stretcher. Physically safe environment: no spills, clutter or unnecessary equipment. Stretcher in lowest position, wheels locked, appropriate side rails in place.
no cold intolerance/no heat intolerance

## 2023-07-07 NOTE — ED ADULT TRIAGE NOTE - STATUS:
ATTENDING STATEMENT    I discussed the case with the Resident. I agree with the Resident's findings and plan, as documented in today's note.    Paola Childress MD   Intact

## 2024-10-05 ENCOUNTER — EMERGENCY (EMERGENCY)
Facility: HOSPITAL | Age: 84
LOS: 1 days | Discharge: HOME | End: 2024-10-05
Attending: STUDENT IN AN ORGANIZED HEALTH CARE EDUCATION/TRAINING PROGRAM
Payer: MEDICARE

## 2024-10-05 VITALS
DIASTOLIC BLOOD PRESSURE: 69 MMHG | OXYGEN SATURATION: 97 % | TEMPERATURE: 98 F | HEART RATE: 59 BPM | SYSTOLIC BLOOD PRESSURE: 118 MMHG | RESPIRATION RATE: 17 BRPM

## 2024-10-05 DIAGNOSIS — Z92.89 PERSONAL HISTORY OF OTHER MEDICAL TREATMENT: Chronic | ICD-10-CM

## 2024-10-05 DIAGNOSIS — Z95.0 PRESENCE OF CARDIAC PACEMAKER: Chronic | ICD-10-CM

## 2024-10-05 DIAGNOSIS — Z98.890 OTHER SPECIFIED POSTPROCEDURAL STATES: Chronic | ICD-10-CM

## 2024-10-05 DIAGNOSIS — Z90.49 ACQUIRED ABSENCE OF OTHER SPECIFIED PARTS OF DIGESTIVE TRACT: Chronic | ICD-10-CM

## 2024-10-05 DIAGNOSIS — Z95.5 PRESENCE OF CORONARY ANGIOPLASTY IMPLANT AND GRAFT: Chronic | ICD-10-CM

## 2024-10-05 LAB
ANION GAP SERPL CALC-SCNC: 10 MMOL/L — SIGNIFICANT CHANGE UP (ref 5–17)
BASOPHILS # BLD AUTO: 0.1 K/UL — SIGNIFICANT CHANGE UP (ref 0–0.2)
BASOPHILS NFR BLD AUTO: 1.3 % — SIGNIFICANT CHANGE UP (ref 0–2)
BUN SERPL-MCNC: 43.2 MG/DL — HIGH (ref 8–20)
CALCIUM SERPL-MCNC: 9 MG/DL — SIGNIFICANT CHANGE UP (ref 8.4–10.5)
CHLORIDE SERPL-SCNC: 107 MMOL/L — SIGNIFICANT CHANGE UP (ref 96–108)
CO2 SERPL-SCNC: 22 MMOL/L — SIGNIFICANT CHANGE UP (ref 22–29)
CREAT SERPL-MCNC: 1.56 MG/DL — HIGH (ref 0.5–1.3)
EGFR: 44 ML/MIN/1.73M2 — LOW
EGFR: 44 ML/MIN/1.73M2 — LOW
EOSINOPHIL # BLD AUTO: 0.54 K/UL — HIGH (ref 0–0.5)
EOSINOPHIL NFR BLD AUTO: 6.9 % — HIGH (ref 0–6)
GLUCOSE SERPL-MCNC: 99 MG/DL — SIGNIFICANT CHANGE UP (ref 70–99)
HCT VFR BLD CALC: 36.4 % — LOW (ref 39–50)
HGB BLD-MCNC: 12.1 G/DL — LOW (ref 13–17)
IMM GRANULOCYTES NFR BLD AUTO: 0.4 % — SIGNIFICANT CHANGE UP (ref 0–0.9)
LYMPHOCYTES # BLD AUTO: 1.53 K/UL — SIGNIFICANT CHANGE UP (ref 1–3.3)
LYMPHOCYTES # BLD AUTO: 19.6 % — SIGNIFICANT CHANGE UP (ref 13–44)
MAGNESIUM SERPL-MCNC: 1.6 MG/DL — SIGNIFICANT CHANGE UP (ref 1.6–2.6)
MCHC RBC-ENTMCNC: 33.2 GM/DL — SIGNIFICANT CHANGE UP (ref 32–36)
MCHC RBC-ENTMCNC: 34.2 PG — HIGH (ref 27–34)
MCV RBC AUTO: 102.8 FL — HIGH (ref 80–100)
MONOCYTES # BLD AUTO: 0.77 K/UL — SIGNIFICANT CHANGE UP (ref 0–0.9)
MONOCYTES NFR BLD AUTO: 9.9 % — SIGNIFICANT CHANGE UP (ref 2–14)
NEUTROPHILS # BLD AUTO: 4.84 K/UL — SIGNIFICANT CHANGE UP (ref 1.8–7.4)
NEUTROPHILS NFR BLD AUTO: 61.9 % — SIGNIFICANT CHANGE UP (ref 43–77)
PLATELET # BLD AUTO: 137 K/UL — LOW (ref 150–400)
POTASSIUM SERPL-MCNC: 4.7 MMOL/L — SIGNIFICANT CHANGE UP (ref 3.5–5.3)
POTASSIUM SERPL-SCNC: 4.7 MMOL/L — SIGNIFICANT CHANGE UP (ref 3.5–5.3)
RBC # BLD: 3.54 M/UL — LOW (ref 4.2–5.8)
RBC # FLD: 12.8 % — SIGNIFICANT CHANGE UP (ref 10.3–14.5)
SODIUM SERPL-SCNC: 139 MMOL/L — SIGNIFICANT CHANGE UP (ref 135–145)
WBC # BLD: 7.81 K/UL — SIGNIFICANT CHANGE UP (ref 3.8–10.5)
WBC # FLD AUTO: 7.81 K/UL — SIGNIFICANT CHANGE UP (ref 3.8–10.5)

## 2024-10-05 PROCEDURE — 99285 EMERGENCY DEPT VISIT HI MDM: CPT

## 2024-10-05 PROCEDURE — 80048 BASIC METABOLIC PNL TOTAL CA: CPT

## 2024-10-05 PROCEDURE — 93005 ELECTROCARDIOGRAM TRACING: CPT

## 2024-10-05 PROCEDURE — 85025 COMPLETE CBC W/AUTO DIFF WBC: CPT

## 2024-10-05 PROCEDURE — 83735 ASSAY OF MAGNESIUM: CPT

## 2024-10-05 PROCEDURE — 93010 ELECTROCARDIOGRAM REPORT: CPT

## 2024-10-05 PROCEDURE — 36415 COLL VENOUS BLD VENIPUNCTURE: CPT

## 2024-10-05 PROCEDURE — 99283 EMERGENCY DEPT VISIT LOW MDM: CPT | Mod: 25

## 2024-10-17 NOTE — PROGRESS NOTE ADULT - PROBLEM SELECTOR PLAN 3
Hypotensive  C/w amlodipine and losartan with parameters
Cardiac cath showed patent stents to LAD/RCA, patent LIMA to diag, and 100% occlusion to OM  TTE reviewed: basal and mid anterolateral wall, mid and apical inferior wall, and basal and mid inferolateral wall akinesis.   C/w asa, plavix, atorvastatin, and metoprolol  Morphine PRN pain control  Cards cs appreciated
Frequently used (Daily)
Cardiac cath showed patent stents to LAD/RCA, patent LIMA to diag, and 100% occlusion to OM  TTE reviewed: basal and mid anterolateral wall, mid and apical inferior wall, and basal and mid inferolateral wall akinesis.   C/w asa, plavix, atorvastatin, and metoprolol  Morphine PRN pain control  Cards cs appreciated
Cardiac cath showed patent stents to LAD/RCA, patent LIMA to diag, and 100% occlusion to OM  TTE reviewed: basal and mid anterolateral wall, mid and apical inferior wall, and basal and mid inferolateral wall akinesis.   C/w asa, plavix, atorvastatin, and metoprolol  Morphine PRN pain control  Cards cs appreciated
No longer having active chest pain  Cardiac cath showed patent stents to LAD/RCA, patent LIMA to diag, and 100% occlusion to OM  TTE reviewed: basal and mid anterolateral wall, mid and apical inferior wall, and basal and mid inferolateral wall akinesis.   C/w asa, plavix, atorvastatin, and metoprolol  Morphine PRN pain control  Cards cs appreciated

## 2024-11-13 NOTE — ED ADULT TRIAGE NOTE - HEIGHT IN CM
generalized: alert, no distress  eyes: clear conjunctiva  msk: right shoulder- +tenderness along bicep tendon to palpation, no bony deformity, good rom of extremity, good cap refill, pulses distally in tact, no crepitation ,  skin: no bruising, no swelling, no lacerations   neuro: alert, oriented, no motor or sensory deficits, gait steady 177.8